# Patient Record
Sex: MALE | Race: WHITE | NOT HISPANIC OR LATINO | Employment: OTHER | URBAN - METROPOLITAN AREA
[De-identification: names, ages, dates, MRNs, and addresses within clinical notes are randomized per-mention and may not be internally consistent; named-entity substitution may affect disease eponyms.]

---

## 2017-05-03 ENCOUNTER — TRANSFERRED RECORDS (OUTPATIENT)
Dept: HEALTH INFORMATION MANAGEMENT | Facility: CLINIC | Age: 72
End: 2017-05-03

## 2017-09-21 ENCOUNTER — TRANSFERRED RECORDS (OUTPATIENT)
Dept: HEALTH INFORMATION MANAGEMENT | Facility: CLINIC | Age: 72
End: 2017-09-21

## 2017-11-13 DIAGNOSIS — Z95.828 HX OF ASCENDING AORTA REPLACEMENT: ICD-10-CM

## 2017-11-14 RX ORDER — METOPROLOL SUCCINATE 25 MG/1
TABLET, EXTENDED RELEASE ORAL
Qty: 45 TABLET | Refills: 0 | Status: SHIPPED | OUTPATIENT
Start: 2017-11-14 | End: 2017-11-29

## 2017-11-14 NOTE — TELEPHONE ENCOUNTER
Medication is being filled for 1 time refill only due to:  Patient needs to be seen because it has been more than one year since last visit.     Letter Sent.    Prescription approved per [facility] Refill Protocol.    Breanna BLAKE RN, BSN, PHN  [place] [facility] RN

## 2017-11-29 ENCOUNTER — OFFICE VISIT (OUTPATIENT)
Dept: PEDIATRICS | Facility: CLINIC | Age: 72
End: 2017-11-29
Payer: COMMERCIAL

## 2017-11-29 VITALS
SYSTOLIC BLOOD PRESSURE: 102 MMHG | DIASTOLIC BLOOD PRESSURE: 68 MMHG | BODY MASS INDEX: 26.37 KG/M2 | TEMPERATURE: 98.2 F | HEART RATE: 62 BPM | WEIGHT: 168 LBS | HEIGHT: 67 IN

## 2017-11-29 DIAGNOSIS — Z13.6 CARDIOVASCULAR SCREENING; LDL GOAL LESS THAN 160: ICD-10-CM

## 2017-11-29 DIAGNOSIS — M51.35 DDD (DEGENERATIVE DISC DISEASE), THORACOLUMBAR: ICD-10-CM

## 2017-11-29 DIAGNOSIS — Q23.81 BICUSPID AORTIC VALVE: ICD-10-CM

## 2017-11-29 DIAGNOSIS — Z95.828 HX OF ASCENDING AORTA REPLACEMENT: ICD-10-CM

## 2017-11-29 DIAGNOSIS — Z00.00 ENCOUNTER FOR ROUTINE ADULT HEALTH EXAMINATION WITHOUT ABNORMAL FINDINGS: Primary | ICD-10-CM

## 2017-11-29 DIAGNOSIS — D12.6 TUBULAR ADENOMA OF COLON: ICD-10-CM

## 2017-11-29 LAB
ALBUMIN SERPL-MCNC: 3.8 G/DL (ref 3.4–5)
ALP SERPL-CCNC: 62 U/L (ref 40–150)
ALT SERPL W P-5'-P-CCNC: 28 U/L (ref 0–70)
ANION GAP SERPL CALCULATED.3IONS-SCNC: 8 MMOL/L (ref 3–14)
AST SERPL W P-5'-P-CCNC: 10 U/L (ref 0–45)
BILIRUB SERPL-MCNC: 0.4 MG/DL (ref 0.2–1.3)
BUN SERPL-MCNC: 18 MG/DL (ref 7–30)
CALCIUM SERPL-MCNC: 9.3 MG/DL (ref 8.5–10.1)
CHLORIDE SERPL-SCNC: 109 MMOL/L (ref 94–109)
CHOLEST SERPL-MCNC: 168 MG/DL
CO2 SERPL-SCNC: 25 MMOL/L (ref 20–32)
CREAT SERPL-MCNC: 1.17 MG/DL (ref 0.66–1.25)
GFR SERPL CREATININE-BSD FRML MDRD: 62 ML/MIN/1.7M2
GLUCOSE SERPL-MCNC: 108 MG/DL (ref 70–99)
HDLC SERPL-MCNC: 51 MG/DL
LDLC SERPL CALC-MCNC: 94 MG/DL
NONHDLC SERPL-MCNC: 117 MG/DL
POTASSIUM SERPL-SCNC: 5.1 MMOL/L (ref 3.4–5.3)
PROT SERPL-MCNC: 6.8 G/DL (ref 6.8–8.8)
SODIUM SERPL-SCNC: 142 MMOL/L (ref 133–144)
TRIGL SERPL-MCNC: 113 MG/DL

## 2017-11-29 PROCEDURE — 80053 COMPREHEN METABOLIC PANEL: CPT | Performed by: INTERNAL MEDICINE

## 2017-11-29 PROCEDURE — 99397 PER PM REEVAL EST PAT 65+ YR: CPT | Performed by: INTERNAL MEDICINE

## 2017-11-29 PROCEDURE — 36415 COLL VENOUS BLD VENIPUNCTURE: CPT | Performed by: INTERNAL MEDICINE

## 2017-11-29 PROCEDURE — 80061 LIPID PANEL: CPT | Performed by: INTERNAL MEDICINE

## 2017-11-29 RX ORDER — METOPROLOL SUCCINATE 25 MG/1
TABLET, EXTENDED RELEASE ORAL
Qty: 45 TABLET | Refills: 3 | Status: SHIPPED | OUTPATIENT
Start: 2017-11-29 | End: 2019-03-18

## 2017-11-29 NOTE — NURSING NOTE
"Chief Complaint   Patient presents with     Medicare Visit       Initial /68 (Cuff Size: Adult Regular)  Pulse 62  Temp 98.2  F (36.8  C) (Oral)  Ht 5' 7\" (1.702 m)  Wt 168 lb (76.2 kg)  BMI 26.31 kg/m2 Estimated body mass index is 26.31 kg/(m^2) as calculated from the following:    Height as of this encounter: 5' 7\" (1.702 m).    Weight as of this encounter: 168 lb (76.2 kg).  Medication Reconciliation: shireen Gates CMA    "

## 2017-11-29 NOTE — MR AVS SNAPSHOT
After Visit Summary   11/29/2017    Mason Beatty    MRN: [id number]           Patient Information     Date Of Birth          1945        Visit Information        Provider Department      11/29/2017 7:40 AM Terry Mcmanus MD [facility]        Today's Diagnoses     Encounter for routine adult health examination without abnormal findings    -  1    Hx of ascending aorta replacement        Bicuspid aortic valve        Tubular adenoma of colon        DDD (degenerative disc disease), thoracolumbar        CARDIOVASCULAR SCREENING; LDL GOAL LESS THAN 160          Care Instructions      Preventive Health Recommendations:       Male Ages 65 and over    Yearly exam:             See your health care provider every year in order to  o   Review health changes.   o   Discuss preventive care.    o   Review your medicines if your doctor has prescribed any.    Talk with your health care provider about whether you should have a test to screen for prostate cancer (PSA).    Every 3 years, have a diabetes test (fasting glucose). If you are at risk for diabetes, you should have this test more often.    Every 5 years, have a cholesterol test. Have this test more often if you are at risk for high cholesterol or heart disease.     Every 10 years, have a colonoscopy. Or, have a yearly FIT test (stool test). These exams will check for colon cancer.    Talk to with your health care provider about screening for Abdominal Aortic Aneurysm if you have a family history of AAA or have a history of smoking.  Shots:     Get a flu shot each year.     Get a tetanus shot every 10 years.     Talk to your doctor about your pneumonia vaccines. There are now two you should receive - Pneumovax (PPSV 23) and Prevnar (PCV 13).    Talk to your doctor about a shingles vaccine.     Talk to your doctor about the hepatitis B vaccine.  Nutrition:     Eat at least 5 servings of fruits and vegetables each day.     Eat  whole-grain bread, whole-wheat pasta and brown rice instead of white grains and rice.     Talk to your doctor about Calcium and Vitamin D.   Lifestyle    Exercise for at least 150 minutes a week (30 minutes a day, 5 days a week). This will help you control your weight and prevent disease.     Limit alcohol to one drink per day.     No smoking.     Wear sunscreen to prevent skin cancer.     See your dentist every six months for an exam and cleaning.     See your eye doctor every 1 to 2 years to screen for conditions such as glaucoma, macular degeneration and cataracts.          Follow-ups after your visit        Who to contact     If you have questions or need follow up information about today's clinic visit or your schedule please contact [facility] at [phone].  Normal or non-critical lab and imaging results will be communicated to you by MyChart, letter or phone within 4 business days after the clinic has received the results. If you do not hear from us within 7 days, please contact the clinic through [employer] or phone. If you have a critical or abnormal lab result, we will notify you by phone as soon as possible.  Submit refill requests through [employer] or call your pharmacy and they will forward the refill request to us. Please allow 3 business days for your refill to be completed.          Additional Information About Your Visit        [employer] Information     [employer] gives you secure access to your electronic health record. If you see a primary care provider, you can also send messages to your care team and make appointments. If you have questions, please call your primary care clinic.  If you do not have a primary care provider, please call [phone] and they will assist you.        Care EveryWhere ID     This is your Care EveryWhere ID. This could be used by other organizations to access your [place] medical records  [id number]        Your Vitals Were     Pulse Temperature Height  "BMI (Body Mass Index)          62 98.2  F (36.8  C) (Oral) 5' 7\" (1.702 m) 26.31 kg/m2         Blood Pressure from Last 3 Encounters:   11/29/17 102/68   09/13/16 108/68   06/30/16 110/78    Weight from Last 3 Encounters:   11/29/17 168 lb (76.2 kg)   09/13/16 169 lb (76.7 kg)   06/30/16 164 lb 4.8 oz (74.5 kg)              We Performed the Following     Comprehensive metabolic panel (BMP + Alb, Alk Phos, ALT, AST, Total. Bili, TP)     Lipid panel reflex to direct LDL Fasting          Today's Medication Changes          These changes are accurate as of: 11/29/17  8:10 AM.  If you have any questions, ask your nurse or doctor.               These medicines have changed or have updated prescriptions.        Dose/Directions    metoprolol 25 MG 24 hr tablet   Commonly known as:  TOPROL-XL   This may have changed:  See the new instructions.   Used for:  Hx of ascending aorta replacement   Changed by:  Terry Mcmanus MD        TAKE ONE-HALF TABLET BY MOUTH ONCE DAILY   Quantity:  45 tablet   Refills:  3            Where to get your medicines      These medications were sent to [id number]  [place], [place] - [address]  [address], [place] [address]     Phone:  [phone]     metoprolol 25 MG 24 hr tablet                Primary Care Provider Office Phone # Fax #    Terry Mcmanus [phone]       [facility] [place] [address]        Equal Access to Services     [facility] AH: Hadii aad ku hadasho Soomaali, waaxda luqadaha, qaybta kaalmada adeegyada, waxay alessio her. So [facility] [phone].    ATENCIÓN: Si habla español, tiene a louis disposición servicios gratuitos de asistencia lingüística. Llame al [phone].    We comply with applicable federal civil rights laws and [place] laws. We do not discriminate on the basis of race, color, national origin, age, disability, sex, sexual orientation, or gender identity.          "   Thank you!     Thank you for choosing [facility] [place]  for your care. Our goal is always to provide you with excellent care. Hearing back from our patients is one way we can continue to improve our services. Please take a few minutes to complete the written survey that you may receive in the mail after your visit with us. Thank you!             Your Updated Medication List - Protect others around you: Learn how to safely use, store and throw away your medicines at www.disposemymeds.org.          This list is accurate as of: 11/29/17  8:10 AM.  Always use your most recent med list.                   Brand Name Dispense Instructions for use Diagnosis    ACETAMINOPHEN EXTRA STRENGTH 500 MG tablet   Generic drug:  acetaminophen      Take 1-2 tablets (500-1,000 mg) by mouth every 6 hours as needed for pain        aspirin 81 MG tablet      1 TABLET DAILY        metoprolol 25 MG 24 hr tablet    TOPROL-XL    45 tablet    TAKE ONE-HALF TABLET BY MOUTH ONCE DAILY    Hx of ascending aorta replacement       Multi-vitamin Tabs tablet   Generic drug:  multivitamin, therapeutic with minerals      None Entered

## 2017-11-29 NOTE — PROGRESS NOTES
SUBJECTIVE:   Mason Beatty is a 72 year old male who presents for Preventive Visit.    Are you in the first 12 months of your Medicare coverage?  No    Physical   Annual:     Getting at least 3 servings of Calcium per day::  Yes    Bi-annual eye exam::  Yes    Dental care twice a year::  Yes    Sleep apnea or symptoms of sleep apnea::  None    Frequency of exercise::  6-7 days/week    Taking medications regularly::  Yes    Medication side effects::  None    Additional concerns today::  No      COGNITIVE SCREEN  1) Repeat 3 items (Banana, Sunrise, Chair)    2) Clock draw: NORMAL  3) 3 item recall: Recalls 2 objects   Results: NORMAL clock, 1-2 items recalled: COGNITIVE IMPAIRMENT LESS LIKELY    Mini-CogTM Copyright JERMAINE Trivedi. Licensed by the author for use in [facility] Text A Cab; reprinted with permission (jeff@[facility]). All rights reserved.      Reviewed and updated as needed this visit by clinical staffTobacco  Allergies  Meds         Reviewed and updated as needed this visit by Provider        Social History   Substance Use Topics     Smoking status: Former Smoker     Quit date: 7/1/1986     Smokeless tobacco: Never Used     Alcohol use Yes      Comment: 1 beer qd       The patient does not drink >3 drinks per day nor >7 drinks per week.    Patient Active Problem List   Diagnosis     CARDIOVASCULAR SCREENING; LDL GOAL LESS THAN 160     Advanced directives, counseling/discussion     Hx of ascending aorta replacement     Bicuspid aortic valve     Tubular adenoma of colon     DDD (degenerative disc disease), thoracolumbar     Chronic lumbar radiculopathy     Past Medical History:   Diagnosis Date     Aortic aneurysm of unspecified site without mention of rupture      Nonspecific abnormal electrocardiogram (ECG) (EKG)      Scoliosis      Transverse myelitis (H)     2001       Past Surgical History:   Procedure Laterality Date     REPAIR ANEURYSM THORACOABDOMINAL      repair 2013, [place]       Family History    Problem Relation Age of Onset     C.A.D. Maternal Grandmother      heart attack     Hypertension Mother      CEREBROVASCULAR DISEASE Paternal Grandmother      Prostate Cancer Father      Cancer - colorectal No family hx of        ALLERGIES     Allergies   Allergen Reactions     Shellfish Allergy Swelling         Today's PHQ-2 Score:   PHQ-2 ( 1999 [employer]) 11/29/2017   Q1: Little interest or pleasure in doing things 0   Q2: Feeling down, depressed or hopeless 0   PHQ-2 Score 0   Q1: Little interest or pleasure in doing things Not at all   Q2: Feeling down, depressed or hopeless Not at all   PHQ-2 Score 0       Do you feel safe in your environment - Yes    Do you have a Health Care Directive?: Yes: Advance Directive has been received and scanned.    Current providers sharing in care for this patient include:   Patient Care Team:  Terry Mcmanus MD as PCP - General      Hearing impairment: No    Ability to successfully perform activities of daily living: Yes, no assistance needed     Fall risk:  Fallen 2 or more times in the past year?: No  Any fall with injury in the past year?: No      Home safety:  none identified      The following health maintenance items are reviewed in Epic and correct as of today:  Health Maintenance   Topic Date Due     HEPATITIS C SCREENING  06/30/1963     FALL RISK ASSESSMENT  09/13/2017     ADVANCE DIRECTIVE PLANNING Q5 YRS  10/21/2019     LIPID SCREEN Q5 YR MALE (SYSTEM ASSIGNED)  08/04/2020     TETANUS IMMUNIZATION (SYSTEM ASSIGNED)  10/04/2020     COLON CANCER SCREEN (SYSTEM ASSIGNED)  07/23/2025     INFLUENZA VACCINE (SYSTEM ASSIGNED)  Completed     PNEUMOCOCCAL  Completed     AORTIC ANEURYSM SCREENING (SYSTEM ASSIGNED)  Completed     Review of Systems  Constitutional, HEENT, cardiovascular, pulmonary, GI, , musculoskeletal, neuro, skin, endocrine and psych systems are negative, except as otherwise noted.      OBJECTIVE:   /68 (Cuff Size: Adult Regular)  Pulse 62  Temp  "98.2  F (36.8  C) (Oral)  Ht 5' 7\" (1.702 m)  Wt 168 lb (76.2 kg)  BMI 26.31 kg/m2 Estimated body mass index is 26.31 kg/(m^2) as calculated from the following:    Height as of this encounter: 5' 7\" (1.702 m).    Weight as of this encounter: 168 lb (76.2 kg).  Physical Exam  GENERAL: healthy, alert and no distress  EYES: Eyes grossly normal to inspection, PERRL and conjunctivae and sclerae normal  HENT: ear canals and TM's normal, nose and mouth without ulcers or lesions  NECK: no adenopathy, no asymmetry, masses, or scars and thyroid normal to palpation  RESP: lungs clear to auscultation - no rales, rhonchi or wheezes  CV: regular rate and rhythm, normal S1 S2, no S3 or S4, no murmur, click or rub, no peripheral edema and peripheral pulses strong  ABDOMEN: soft, nontender, no hepatosplenomegaly, no masses and bowel sounds normal  MS: no gross musculoskeletal defects noted, no edema  SKIN: no suspicious lesions or rashes  NEURO: Normal strength and tone, mentation intact and speech normal  PSYCH: mentation appears normal, affect normal/bright    ASSESSMENT / PLAN:       ICD-10-CM    1. Encounter for routine adult health examination without abnormal findings Z00.00        2. Hx of ascending aorta replacement Z95.828 metoprolol (TOPROL-XL) 25 MG 24 hr tablet.  Has done well since repair a few years ago     3. Bicuspid aortic valve Q23.1 Continue beta blocker     4. Tubular adenoma of colon D12.6 Next colonoscopy due 2020     5. DDD (degenerative disc disease), thoracolumbar M51.35 Chronic LBP, sx well controlled /continues daily therapy exercises     6. CARDIOVASCULAR SCREENING; LDL GOAL LESS THAN 160 Z13.6 Lipid panel reflex to direct LDL Fasting     Comprehensive metabolic panel (BMP + Alb, Alk Phos, ALT, AST, Total. Bili, TP)       End of Life Planning:  Patient currently has an advanced directive: Yes.  Practitioner is supportive of decision.    COUNSELING:  Reviewed preventive health counseling, as reflected " "in patient instructions       Regular exercise       Healthy diet/nutrition       Colon cancer screening    Estimated body mass index is 26.31 kg/(m^2) as calculated from the following:    Height as of this encounter: 5' 7\" (1.702 m).    Weight as of this encounter: 168 lb (76.2 kg).     reports that he quit smoking about 31 years ago. He has never used smokeless tobacco.        Appropriate preventive services were discussed with this patient, including applicable screening as appropriate for cardiovascular disease, diabetes, osteopenia/osteoporosis, and glaucoma.  As appropriate for age/gender, discussed screening for colorectal cancer, prostate cancer, breast cancer, and cervical cancer. Checklist reviewing preventive services available has been given to the patient.    Reviewed patients plan of care and provided an AVS. The Basic Care Plan (routine screening as documented in Health Maintenance) for Mason meets the Care Plan requirement. This Care Plan has been established and reviewed with the Patient.    Counseling Resources:  ATP IV Guidelines  Pooled Cohorts Equation Calculator  Breast Cancer Risk Calculator  FRAX Risk Assessment  ICSI Preventive Guidelines  Dietary Guidelines for Americans, 2010  USDA's MyPlate  ASA Prophylaxis  Lung CA Screening    Terry Mcmanus MD, MD  [facility] [place]  "

## 2017-12-04 ENCOUNTER — TELEPHONE (OUTPATIENT)
Dept: PEDIATRICS | Facility: CLINIC | Age: 72
End: 2017-12-04

## 2017-12-04 DIAGNOSIS — Z12.5 ENCOUNTER FOR SCREENING FOR MALIGNANT NEOPLASM OF PROSTATE: Primary | ICD-10-CM

## 2017-12-04 NOTE — TELEPHONE ENCOUNTER
Patient was seen on 11/29/17for physical.  Wife is calling and would like PSA ordered. They feel this is a test that should be monitored.  Order pended please sign if in agreement.  Send patient a My chart message regarding this order.  DANAE Clinton RN

## 2017-12-11 DIAGNOSIS — Z12.5 ENCOUNTER FOR SCREENING FOR MALIGNANT NEOPLASM OF PROSTATE: ICD-10-CM

## 2017-12-11 PROCEDURE — G0103 PSA SCREENING: HCPCS | Performed by: INTERNAL MEDICINE

## 2017-12-12 LAB — PSA SERPL-ACNC: 1.81 UG/L (ref 0–4)

## 2018-07-24 ENCOUNTER — MYC MEDICAL ADVICE (OUTPATIENT)
Dept: PEDIATRICS | Facility: CLINIC | Age: 73
End: 2018-07-24

## 2018-07-24 DIAGNOSIS — S93.402D SPRAIN OF LEFT ANKLE, UNSPECIFIED LIGAMENT, SUBSEQUENT ENCOUNTER: Primary | ICD-10-CM

## 2018-07-24 NOTE — TELEPHONE ENCOUNTER
Order pended, please sign if in agreement and fax to [facility].  Will have to look up that number-I don't have that information.  DANAE Clinton RN  .

## 2018-08-01 ENCOUNTER — MYC MEDICAL ADVICE (OUTPATIENT)
Dept: PEDIATRICS | Facility: CLINIC | Age: 73
End: 2018-08-01

## 2018-08-01 DIAGNOSIS — M25.579 PAIN IN JOINT, ANKLE AND FOOT, UNSPECIFIED LATERALITY: Primary | ICD-10-CM

## 2019-03-18 DIAGNOSIS — Z95.828 HX OF ASCENDING AORTA REPLACEMENT: ICD-10-CM

## 2019-03-18 NOTE — TELEPHONE ENCOUNTER
"Requested Prescriptions   Pending Prescriptions Disp Refills     metoprolol succinate ER (TOPROL-XL) 25 MG 24 hr tablet [Pharmacy Med Name: METOPROLOL ER 25MG  TAB]  Last Written Prescription Date:  11/29/2017  Last Fill Quantity: 45 tablet,  # refills: 3   Last office visit: 11/29/2017 with prescribing provider:  Terry Mcmanus MD   Future Office Visit:     45 tablet 3     Sig: TAKE ONE-HALF TABLET BY MOUTH ONCE DAILY    Beta-Blockers Protocol Failed - 3/18/2019 11:43 AM       Failed - Blood pressure under 140/90 in past 12 months    BP Readings from Last 3 Encounters:   11/29/17 102/68   09/13/16 108/68   06/30/16 110/78                Failed - Recent (12 mo) or future (30 days) visit within the authorizing provider's specialty    Patient had office visit in the last 12 months or has a visit in the next 30 days with authorizing provider or within the authorizing provider's specialty.  See \"Patient Info\" tab in inbasket, or \"Choose Columns\" in Meds & Orders section of the refill encounter.             Passed - Patient is age 6 or older       Passed - Medication is active on med list          "

## 2019-03-19 RX ORDER — METOPROLOL SUCCINATE 25 MG/1
12.5 TABLET, EXTENDED RELEASE ORAL DAILY
Qty: 45 TABLET | Refills: 0 | Status: SHIPPED | OUTPATIENT
Start: 2019-03-19 | End: 2019-04-22

## 2019-03-19 NOTE — TELEPHONE ENCOUNTER
Medication is being filled for 1 time refill only due to:  Patient needs to be seen because it has been more than one year since last visit. 11/28/17-call to schedule appointment with pcp    Rosalee Waters RN

## 2019-04-22 ENCOUNTER — OFFICE VISIT (OUTPATIENT)
Dept: PEDIATRICS | Facility: CLINIC | Age: 74
End: 2019-04-22
Payer: MEDICARE

## 2019-04-22 VITALS
WEIGHT: 171 LBS | BODY MASS INDEX: 26.84 KG/M2 | HEART RATE: 72 BPM | OXYGEN SATURATION: 97 % | SYSTOLIC BLOOD PRESSURE: 114 MMHG | DIASTOLIC BLOOD PRESSURE: 70 MMHG | HEIGHT: 67 IN | TEMPERATURE: 97.6 F

## 2019-04-22 DIAGNOSIS — Z95.828 HX OF ASCENDING AORTA REPLACEMENT: ICD-10-CM

## 2019-04-22 DIAGNOSIS — D23.9 INTRADERMAL NEVUS: ICD-10-CM

## 2019-04-22 DIAGNOSIS — M79.674 PAIN OF TOE OF RIGHT FOOT: ICD-10-CM

## 2019-04-22 DIAGNOSIS — M54.16 CHRONIC LUMBAR RADICULOPATHY: Primary | ICD-10-CM

## 2019-04-22 DIAGNOSIS — Z12.5 SCREENING FOR PROSTATE CANCER: ICD-10-CM

## 2019-04-22 PROCEDURE — 99214 OFFICE O/P EST MOD 30 MIN: CPT | Performed by: INTERNAL MEDICINE

## 2019-04-22 RX ORDER — METOPROLOL SUCCINATE 25 MG/1
12.5 TABLET, EXTENDED RELEASE ORAL DAILY
Qty: 45 TABLET | Refills: 3 | Status: SHIPPED | OUTPATIENT
Start: 2019-04-22 | End: 2020-06-01

## 2019-04-22 ASSESSMENT — MIFFLIN-ST. JEOR: SCORE: 1481.34

## 2019-04-22 NOTE — PROGRESS NOTES
SUBJECTIVE:   Mason Beatty is a 73 year old male who presents to clinic today for the following   health issues:    History of chronic low back pain and chronic lumbar radiculopathy.  Symptoms persistent for several years.  Since most recent visit patient has been following up with [facility].  Patient describes chronic pain across his lower back radiating to the posterior aspect of both legs right worse than left.  Denies stool or urinary incontinence.  Patient is planning for additional evaluation through [facility] however [facility] requires an MRI scan prior to the visit.    Concerns about pain in his second toe.  Denies any recent injury.  States that second toe tends to hyperextend at the distal IP joint which tends to cause pain.  Denies numbness or paresthesias.    Concerns about facial nevi he would like looked at today.  Noted over the past few months, possibly slightly larger in size.    Patient  is requesting prostate cancer screening.  Patient denies urinary hesitancy, decreased force of stream or other BPH symptoms.  Denies hematuria.    Several years out from surgery for replacement of a sending aorta secondary to aortic aneurysm.  Has had no long-term complications continues on long-term beta-blocker.      Amount of exercise or physical activity: walk 2 miles daily    Problems taking medications regularly: No    Medication side effects: none    Diet: low salt      Patient Active Problem List   Diagnosis     CARDIOVASCULAR SCREENING; LDL GOAL LESS THAN 160     Advanced directives, counseling/discussion     Hx of ascending aorta replacement     Bicuspid aortic valve     Tubular adenoma of colon     DDD (degenerative disc disease), thoracolumbar     Chronic lumbar radiculopathy     Past Surgical History:   Procedure Laterality Date     REPAIR ANEURYSM THORACOABDOMINAL      repair 2013, [place]       Social History     Tobacco Use     Smoking status: Former Smoker     Last attempt to quit:  "1986     Years since quittin.8     Smokeless tobacco: Never Used   Substance Use Topics     Alcohol use: Yes     Comment: 1 beer qd     Family History   Problem Relation Age of Onset     C.A.D. Maternal Grandmother         heart attack     Hypertension Mother      Cerebrovascular Disease Paternal Grandmother      Prostate Cancer Father      Cancer - colorectal No family hx of          Current Outpatient Medications   Medication Sig Dispense Refill     acetaminophen (ACETAMINOPHEN EXTRA STRENGTH) 500 MG tablet Take 1-2 tablets (500-1,000 mg) by mouth every 6 hours as needed for pain       ASPIRIN 81 MG OR TABS 1 TABLET DAILY       metoprolol succinate ER (TOPROL-XL) 25 MG 24 hr tablet Take 0.5 tablets (12.5 mg) by mouth daily 45 tablet 3     MULTI-VITAMIN OR TABS None Entered         ROS: The following systems have been completely reviewed and are negative except as noted in the HPI: CONSTITUTIONAL,  CARDIOVASCULAR, PULMONARY DERMATOLOGIC, NEUROLOGIC    OBJECTIVE:                                                    /70 (Cuff Size: Adult Regular)   Pulse 72   Temp 97.6  F (36.4  C) (Oral)   Ht 1.689 m (5' 6.5\")   Wt 77.6 kg (171 lb)   SpO2 97%   BMI 27.19 kg/m   Body mass index is 27.19 kg/m .  GENERAL:  alert,  no distress  Derm: 3 flesh-colored well demarcated exophytic subcutaneous nevi on both cheeks, each approximately 5 mm  RESP: lungs clear to auscultation - no rales, no rhonchi, no wheezes  CV: regular rates and rhythm, normal S1 S2, no S3 or S4 and no murmur, no click or rub  Back: generalized tenderness across low back, straight leg raise reproduces sx b/l  MS: extremities- no edema.  Second toe: Without erythema warmth or skin breakdown     ASSESSMENT/PLAN:                                                        ICD-10-CM    1. Chronic lumbar radiculopathy M54.16 MR Lumbar Spine w/o Contrast      Chronic low back pain and chronic lumbar radicular symptoms.  Patient requests MRI scan prior " to [facility] visit requested by [place]     2. Pain of toe of right foot M79.674  normal exam, low suspicion for fracture.  Recommended juan taping second and third toes for the next week or 2 and follow-up if sx persist     3. Intradermal nevus   intradermal nevi.  Diagnosis discussed, reassurance     4. Screening for prostate cancer Z12.5  patient requests PSA screening today.  Reviewed guidelines/PSA generally not recommended over age 70.  Pros and cons of PSA testing reviewed-patient declines lab work today     5. Hx of ascending aorta replacement Z95.828 metoprolol succinate ER (TOPROL-XL) 25 MG 24 hr tablet        Terry Mcmanus MD  [facility]

## 2019-04-23 ENCOUNTER — HOSPITAL ENCOUNTER (OUTPATIENT)
Dept: MRI IMAGING | Facility: CLINIC | Age: 74
Discharge: HOME | End: 2019-04-23
Attending: INTERNAL MEDICINE | Admitting: INTERNAL MEDICINE
Payer: MEDICARE

## 2019-04-23 ENCOUNTER — TRANSFERRED RECORDS (OUTPATIENT)
Dept: HEALTH INFORMATION MANAGEMENT | Facility: CLINIC | Age: 74
End: 2019-04-23

## 2019-04-23 DIAGNOSIS — M54.16 CHRONIC LUMBAR RADICULOPATHY: ICD-10-CM

## 2019-04-23 PROCEDURE — 72148 MRI LUMBAR SPINE W/O DYE: CPT

## 2019-10-02 ENCOUNTER — HEALTH MAINTENANCE LETTER (OUTPATIENT)
Age: 74
End: 2019-10-02

## 2019-10-30 ENCOUNTER — HEALTH MAINTENANCE LETTER (OUTPATIENT)
Age: 74
End: 2019-10-30

## 2019-11-07 ENCOUNTER — TRANSFERRED RECORDS (OUTPATIENT)
Dept: HEALTH INFORMATION MANAGEMENT | Facility: CLINIC | Age: 74
End: 2019-11-07

## 2019-12-16 ENCOUNTER — HEALTH MAINTENANCE LETTER (OUTPATIENT)
Age: 74
End: 2019-12-16

## 2020-05-31 DIAGNOSIS — Z95.828 HX OF ASCENDING AORTA REPLACEMENT: ICD-10-CM

## 2020-06-01 RX ORDER — METOPROLOL SUCCINATE 25 MG/1
TABLET, EXTENDED RELEASE ORAL
Qty: 45 TABLET | Refills: 3 | Status: SHIPPED | OUTPATIENT
Start: 2020-06-01 | End: 2021-06-15

## 2020-06-01 NOTE — TELEPHONE ENCOUNTER
LVM for pt to call clinic.  Please schedule pt for a med check appointment with Dr. Mcmanus.  Also please ask PT if they will have enough rx until the appointment and route back to ea refill

## 2020-06-01 NOTE — TELEPHONE ENCOUNTER
Routing refill request to provider for review/approval because:  Labs not current:  BP  Patient needs to be seen because it has been more than 1 year since last office visit.    Care team please assist patient in scheduling appt.  PCP please advise on refill.    Trevor ARROYO RN, BSN

## 2020-06-02 DIAGNOSIS — Z95.828 HX OF ASCENDING AORTA REPLACEMENT: ICD-10-CM

## 2020-06-02 RX ORDER — METOPROLOL SUCCINATE 25 MG/1
TABLET, EXTENDED RELEASE ORAL
Qty: 45 TABLET | Refills: 3 | Status: CANCELLED | OUTPATIENT
Start: 2020-06-02

## 2020-06-02 NOTE — TELEPHONE ENCOUNTER
Pending Prescriptions:                       Disp   Refills    metoprolol succinate ER (TOPROL-XL) 25 MG*45 tab*3          Pt has phone visit with Dr Mcmanus 6/9/20 for med review.  Pt will need refill before that appt.  Please complete refill.   Pt Ph # [phone].  Thank you.  oneal

## 2020-06-09 ENCOUNTER — VIRTUAL VISIT (OUTPATIENT)
Dept: PEDIATRICS | Facility: CLINIC | Age: 75
End: 2020-06-09
Payer: MEDICARE

## 2020-06-09 DIAGNOSIS — Z95.828 HX OF ASCENDING AORTA REPLACEMENT: ICD-10-CM

## 2020-06-09 DIAGNOSIS — M72.0 DUPUYTREN'S CONTRACTURE OF HAND: ICD-10-CM

## 2020-06-09 DIAGNOSIS — M41.9 SCOLIOSIS, UNSPECIFIED SCOLIOSIS TYPE, UNSPECIFIED SPINAL REGION: ICD-10-CM

## 2020-06-09 DIAGNOSIS — M51.35 DDD (DEGENERATIVE DISC DISEASE), THORACOLUMBAR: Primary | ICD-10-CM

## 2020-06-09 PROCEDURE — 99442 ZZC PHYSICIAN TELEPHONE EVALUATION 11-20 MIN: CPT | Performed by: INTERNAL MEDICINE

## 2020-06-09 NOTE — PROGRESS NOTES
"Mason Beatty is a 74 year old male who is being evaluated via a billable telephone visit.      The patient has been notified of following:     \"This telephone visit will be conducted via a call between you and your physician/provider. We have found that certain health care needs can be provided without the need for a physical exam.  This service lets us provide the care you need with a short phone conversation.  If a prescription is necessary we can send it directly to your pharmacy.  If lab work is needed we can place an order for that and you can then stop by our lab to have the test done at a later time.    Telephone visits are billed at different rates depending on your insurance coverage. During this emergency period, for some insurers they may be billed the same as an in-person visit.  Please reach out to your insurance provider with any questions.    If during the course of the call the physician/provider feels a telephone visit is not appropriate, you will not be charged for this service.\"    Patient has given verbal consent for Telephone visit?  Yes    What phone number would you like to be contacted at? [phone]    How would you like to obtain your AVS? [place]    Subjective     Mason Beatty is a 74 year old male who presents via phone visit today for the following health issues:    History of chronic low back pain secondary to lumbar degenerative disc disease and scoliosis.  Continues to note daily low back pain at times radiating to the posterior aspect of both legs.  Patient has met with orthopedics/spine and no procedures planned at this time.  Patient denies numbness paresthesias or weakness of the lower extremities.    History of surgical replacement of ascending aorta, now several years postop.  Continues low-dose beta-blocker which he is tolerating without side effects.    Additional concerns regarding deformity of his fifth digit of one hand.  States his digit is fixed in a flexed " position at the IP joint.  No associated pain or recent injury, deformity has gradually worsened over the past year.    Patient Active Problem List   Diagnosis     Advanced directives, counseling/discussion     Hx of ascending aorta replacement     Bicuspid aortic valve     Tubular adenoma of colon     DDD (degenerative disc disease), thoracolumbar     Chronic lumbar radiculopathy     Scoliosis, unspecified scoliosis type, unspecified spinal region     Past Surgical History:   Procedure Laterality Date     REPAIR ANEURYSM THORACOABDOMINAL      repair , [place]       Social History     Tobacco Use     Smoking status: Former Smoker     Last attempt to quit: 1986     Years since quittin.9     Smokeless tobacco: Never Used   Substance Use Topics     Alcohol use: Yes     Comment: 1 beer qd     Family History   Problem Relation Age of Onset     C.A.D. Maternal Grandmother         heart attack     Hypertension Mother      Cerebrovascular Disease Paternal Grandmother      Prostate Cancer Father      Cancer - colorectal No family hx of          Current Outpatient Medications   Medication Sig Dispense Refill     acetaminophen (ACETAMINOPHEN EXTRA STRENGTH) 500 MG tablet Take 1-2 tablets (500-1,000 mg) by mouth every 6 hours as needed for pain       ASPIRIN 81 MG OR TABS 1 TABLET DAILY       metoprolol succinate ER (TOPROL-XL) 25 MG 24 hr tablet Take 1/2 (one-half) tablet by mouth once daily 45 tablet 3     MULTI-VITAMIN OR TABS None Entered         Reviewed and updated as needed this visit by Provider         Review of Systems   cardiovascular, pulmonary are negative, except as otherwise noted.       Objective         Assessment/Plan:  1. DDD (degenerative disc disease), thoracolumbar  2. Scoliosis, unspecified scoliosis type, unspecified spinal region      Discussed nonoperative management of chronic low back pain.  Patient continues daily physical therapy exercises, recommended trial of yoga    3. Hx of ascending  aorta replacement     Continue metoprolol    4. Dupuytren's contracture of hand      Suspect flexion contracture based on history referred to orthopedics  - Orthopedic & Spine  Referral; Future    Phone call duration:  12 minutes    Terry Mcmanus MD

## 2020-06-09 NOTE — PATIENT INSTRUCTIONS
Mason thank you for your time today.  Here's a summary of our virtual visit and the plan we discussed.   Please let us know if you have any additional questions:    Please call to schedule a visit with orthopedics:    [facility] ([place]) [phone]     Have a good day!  Dr Mcmanus

## 2020-06-16 ENCOUNTER — TRANSFERRED RECORDS (OUTPATIENT)
Dept: HEALTH INFORMATION MANAGEMENT | Facility: CLINIC | Age: 75
End: 2020-06-16

## 2020-07-29 ENCOUNTER — OFFICE VISIT (OUTPATIENT)
Dept: PEDIATRICS | Facility: CLINIC | Age: 75
End: 2020-07-29
Payer: MEDICARE

## 2020-07-29 VITALS
DIASTOLIC BLOOD PRESSURE: 62 MMHG | HEART RATE: 63 BPM | OXYGEN SATURATION: 97 % | RESPIRATION RATE: 16 BRPM | WEIGHT: 174.3 LBS | SYSTOLIC BLOOD PRESSURE: 118 MMHG | BODY MASS INDEX: 26.42 KG/M2 | HEIGHT: 68 IN | TEMPERATURE: 96.4 F

## 2020-07-29 DIAGNOSIS — Q23.81 BICUSPID AORTIC VALVE: ICD-10-CM

## 2020-07-29 DIAGNOSIS — Z01.818 PREOP GENERAL PHYSICAL EXAM: Primary | ICD-10-CM

## 2020-07-29 DIAGNOSIS — I35.1 AORTIC VALVE INSUFFICIENCY, ETIOLOGY OF CARDIAC VALVE DISEASE UNSPECIFIED: ICD-10-CM

## 2020-07-29 DIAGNOSIS — L84 CALLUS OF FOOT: ICD-10-CM

## 2020-07-29 DIAGNOSIS — Z95.828 HX OF ASCENDING AORTA REPLACEMENT: ICD-10-CM

## 2020-07-29 DIAGNOSIS — H25.9 AGE-RELATED CATARACT OF BOTH EYES, UNSPECIFIED AGE-RELATED CATARACT TYPE: ICD-10-CM

## 2020-07-29 PROCEDURE — 99215 OFFICE O/P EST HI 40 MIN: CPT | Mod: GC | Performed by: STUDENT IN AN ORGANIZED HEALTH CARE EDUCATION/TRAINING PROGRAM

## 2020-07-29 ASSESSMENT — MIFFLIN-ST. JEOR: SCORE: 1510.12

## 2020-07-29 NOTE — PROGRESS NOTES
[facility]  [address]  [address]  [place] [address]  [phone]  Dept: [phone]    PRE-OP EVALUATION:  Today's date: 2020    Mason Beatty (: 1945) presents for pre-operative evaluation assessment as requested by Dr. Schaefer.  He requires evaluation and anesthesia risk assessment prior to undergoing surgery/procedure for treatment of Cataract.    Proposed Surgery/ Procedure: Cataract   Date of Surgery/ Procedure:  &    Time of Surgery/ Procedure: NA  Hospital/Surgical Facility: [facility]   Surgery Fax Number: [phone]  Primary Physician: Terry Mcmanus  Type of Anesthesia Anticipated: NA    Preoperative Questionnaire:   No - Have you ever had a heart attack or stroke?  Yes - Have you ever had surgery on your heart or blood vessels, such as a stent, coronary (heart) bypass, or surgery on an artery in the head, neck, heart, or legs? - aortic aneurysm repair  No - Do you have chest pain when you are physically active?  No - Do you have a history of heart failure?  No - Do you currently have a cold, bronchitis, or symptoms of other respiratory (head and chest) infections?  No - Do you have a cough, shortness of breath, or wheezing?  No - Do you or anyone in your family have a history of blood clots?  No - Do you or anyone in your family have a serious bleeding problem, such as long-lasting bleeding after surgeries or cuts?  No - Have you ever had anemia or been told to take iron pills?  No - Have you had any abnormal blood loss such as black, tarry or bloody stools, or abnormal vaginal bleeding?  Yes - Have you ever had a blood transfusion? For aortic aneurysm repair  Yes - Are you willing to have a blood transfusion if it is medically needed before, during, or after your surgery?  No - Have you or anyone in your family ever had problems with anesthesia (sedation for surgery)?  No - Do you have sleep apnea, excessive  snoring, or daytime drowsiness?  No - Do you have any artifical heart valves or other implanted medical devices, such as a pacemaker, defibrillator, or continuous glucose monitor?  No - Do you have any artifical joints?  No - Are you allergic to latex?  No - Is there any chance that you may be pregnant?    Patient has a Health Care Directive or Living Will:  YES    HPI:     HPI related to upcoming procedure: Has had issues with vision. Had annual vision checks with optometrist and could not improve anymore with corrective lenses, so recommended surgery. No chest pain, shortness of breath, abdominal pain, lightheadedness.    History of aortic aneurysm arch repair and has been on beta blocker and aspirin. Also takes vitamin C.    Activities: Housework, mow the lawn. Elliptical 30 minutes daily, walks dog 2 miles per day. Climbs flight of stairs without issues.      See problem list for active medical problems.  Problems all longstanding and stable, except as noted/documented.  See ROS for pertinent symptoms related to these conditions.      MEDICAL HISTORY:     Patient Active Problem List    Diagnosis Date Noted     Scoliosis, unspecified scoliosis type, unspecified spinal region 06/09/2020     Priority: Medium     Chronic lumbar radiculopathy 09/13/2016     Priority: Medium     DDD (degenerative disc disease), thoracolumbar 08/04/2015     Priority: Medium     Tubular adenoma of colon 08/02/2015     Priority: Medium     Bicuspid aortic valve      Priority: Medium     Hx of ascending aorta replacement 12/02/2013     Priority: Medium     26 mm Hemashield Dacron graft       Advanced directives, counseling/discussion 03/11/2013     Priority: Low     10/21/14 Advance Directive Invitation mailed to patient. AD Calhoun RN          Past Medical History:   Diagnosis Date     Aortic aneurysm of unspecified site without mention of rupture      Nonspecific abnormal electrocardiogram (ECG) (EKG)      Scoliosis      Transverse  "myelitis (H)          Past Surgical History:   Procedure Laterality Date     REPAIR ANEURYSM THORACOABDOMINAL      repair 2013, [place]     Current Outpatient Medications   Medication Sig Dispense Refill     acetaminophen (ACETAMINOPHEN EXTRA STRENGTH) 500 MG tablet Take 1-2 tablets (500-1,000 mg) by mouth every 6 hours as needed for pain       ASPIRIN 81 MG OR TABS 1 TABLET DAILY       metoprolol succinate ER (TOPROL-XL) 25 MG 24 hr tablet Take 1/2 (one-half) tablet by mouth once daily 45 tablet 3     MULTI-VITAMIN OR TABS None Entered       OTC products: herbals and vitamins - vitamin C; Tylenol and ibuprofen 2-3x per week    Allergies   Allergen Reactions     Shellfish Allergy Swelling      Latex Allergy: NO    Social History     Tobacco Use     Smoking status: Former Smoker     Last attempt to quit: 1986     Years since quittin.1     Smokeless tobacco: Never Used   Substance Use Topics     Alcohol use: Yes     Comment: 1 beer qd     History   Drug Use No     REVIEW OF SYSTEMS:   Constitutional, neuro, ENT, endocrine, pulmonary, cardiac, gastrointestinal, genitourinary, musculoskeletal, integument and psychiatric systems are negative, except as otherwise noted.    EXAM:   /62 (BP Location: Right arm, Patient Position: Chair, Cuff Size: Adult Regular)   Pulse 63   Temp 96.4  F (35.8  C) (Tympanic)   Resp 16   Ht 1.727 m (5' 8\")   Wt 79.1 kg (174 lb 4.8 oz)   SpO2 97%   BMI 26.50 kg/m      GENERAL APPEARANCE: healthy, alert and no distress     EYES: EOMI,  PERRL     HENT: ear canals and TM's normal and nose and mouth without ulcers or lesions     NECK: no adenopathy, no asymmetry, masses, or scars      RESP: lungs clear to auscultation - no rales, rhonchi or wheezes     CV: regular rate and rhythm, normal S1 S2; mild diastolic murmur at left parasternal border; well healed midline sternal surgical incision     ABDOMEN:  soft, nontender, no HSM or masses and bowel sounds normal     MS: " extremities normal- no gross deformities noted, no evidence of inflammation in joints, FROM in all extremities.     SKIN: no suspicious lesions or rashes     NEURO: Normal strength and tone, sensory exam grossly normal, mentation intact and speech normal     PSYCH: mentation appears normal. and affect normal/bright     LYMPHATICS: No cervical adenopathy    DIAGNOSTICS:   No labs or EKG required for low risk surgery (cataract, skin procedure, breast biopsy, etc)    Recent Labs   Lab Test 11/29/17  0815 08/04/15  1013 12/10/13  1442 12/10/13  0000   HGB  --   --  10.9* 10.9   PLT  --   --  456*  --     140 138 138   POTASSIUM 5.1 4.2 4.6 4.6   CR 1.17 1.00 0.91 0.91      IMPRESSION:   Reason for surgery/procedure: Cataract, bilateral  Diagnosis/reason for consult: Pre-operative examination    The proposed surgical procedure is considered LOW risk.    REVISED CARDIAC RISK INDEX  The patient has the following serious cardiovascular risks for perioperative complications such as (MI, PE, VFib and 3  AV Block):  No serious cardiac risks  INTERPRETATION: 1 risks: Class II (low risk - 0.9% complication rate)    The patient has the following additional risks for perioperative complications:  The 10-year ASCVD risk score (Barbrula BROWN Jr., et al., 2013) is: 17.9%    Values used to calculate the score:      Age: 75 years      Sex: Male      Is Non- : No      Diabetic: No      Tobacco smoker: No      Systolic Blood Pressure: 118 mmHg      Is BP treated: No      HDL Cholesterol: 51 mg/dL      Total Cholesterol: 168 mg/dL      ICD-10-CM    1. Preop general physical exam  Z01.818    2. Age-related cataract of both eyes, unspecified age-related cataract type  H25.9    3. Hx of ascending aorta replacement  Z95.828 CT Chest Angio w/o & w Contrast     Echocardiogram Complete     CANCELED: ECHO Congenital Transthoracic (TTE)   4. Aortic valve insufficiency, etiology of cardiac valve disease unspecified  I35.1  Echocardiogram Complete     CANCELED: ECHO Congenital Transthoracic (TTE)   5. Bicuspid aortic valve  Q23.1 Echocardiogram Complete     CANCELED: ECHO Congenital Transthoracic (TTE)   6. Callus of foot  [facility]  Referral       RECOMMENDATIONS:   Cardiovascular Risk  Performs 4 METs exercise without symptoms (Light housework (dusting, washing dishes), Climb a flight of stairs and Walk on level ground at 15 minutes per mile (4 miles/hour)) .   Patient is already on a Beta Blocker. Continue Betablocker therapy after surgery, using Beta blocker order set as necessary for NPO status.    --Patient is to take all scheduled medications on the day of surgery EXCEPT for modifications listed below.    APPROVAL GIVEN to proceed with proposed procedure, without further diagnostic evaluation  Should have echocardiogram and CTA chest to follow up on bicuspid valve & aortic regurgitation and aneurysm repair, but this should not delay surgery.    Signed Electronically by: Jazzmine Anthony MD    Copy of this evaluation report is provided to requesting physician.    [facility] Preop Guidelines    Revised Cardiac Risk Index    ---------------    I personally saw this patient and discussed this case in depth with Dr. Anthony. I reviewed and agree with the key components of the history, physical, assessment, and plan. Will staff message PCP about Cardiology follow up and Imaging ordered.      TRACI Sherman MD  Internal Medicine-Pediatrics

## 2020-08-18 ENCOUNTER — OFFICE VISIT (OUTPATIENT)
Dept: PODIATRY | Facility: CLINIC | Age: 75
End: 2020-08-18
Payer: MEDICARE

## 2020-08-18 VITALS
SYSTOLIC BLOOD PRESSURE: 120 MMHG | WEIGHT: 174.3 LBS | HEIGHT: 68 IN | DIASTOLIC BLOOD PRESSURE: 64 MMHG | BODY MASS INDEX: 26.42 KG/M2

## 2020-08-18 DIAGNOSIS — L84 CALLUS OF FOOT: ICD-10-CM

## 2020-08-18 DIAGNOSIS — M21.611 BUNION OF GREAT TOE OF RIGHT FOOT: Primary | ICD-10-CM

## 2020-08-18 PROCEDURE — 99204 OFFICE O/P NEW MOD 45 MIN: CPT | Performed by: PODIATRIST

## 2020-08-18 ASSESSMENT — MIFFLIN-ST. JEOR: SCORE: 1510.12

## 2020-08-18 NOTE — PROGRESS NOTES
"Foot & Ankle Surgery  August 18, 2020    CC: \"bunion?\"    I was asked to see Mason Beatty regarding the chief complaint by:  Dr. DANAE Anthony    HPI:  Pt is a 75 year old male who presents with above complaint.  Right foot pain x years.  \"information/diagnosis/cure(?)\".  \"none usually\" for symptoms.  Points to plantar-medial R 1st MPJ, where he has callusing/blood blister formation in setting of prominent bunion.  \"good question\" when asked if the bump is painful or the callusing is the issue.  Seems to indicate the callusing/blood blister is more bothersome.  Has comfortable shoes.  No treatment otherwise noted.  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  The patient denies current nausea, vomiting, chills, fevers, belly pain, calf pain, chest pain or SOB.  Complete remainder of ROS is otherwise neg.    VITALS:    Vitals:    08/18/20 0829   BP: 120/64   Weight: 79.1 kg (174 lb 4.8 oz)   Height: 1.727 m (5' 8\")       PMH:    Past Medical History:   Diagnosis Date     Aortic aneurysm of unspecified site without mention of rupture      Nonspecific abnormal electrocardiogram (ECG) (EKG)      Scoliosis      Transverse myelitis (H)     2001       SXHX:    Past Surgical History:   Procedure Laterality Date     REPA[facility]    Current Outpatient Medications   Medication     acetaminophen (ACETAMINOPHEN EXTRA STRENGTH) 500 MG tablet     ASPIRIN 81 MG OR TABS     metoprolol succinate ER (TOPROL-XL) 25 MG 24 hr tablet     MULTI-VITAMIN OR TABS     No current facility-administered medications for this visit.        ALL:     Allergies   Allergen Reactions     Shellfish Allergy Swelling       FMH:    Family History   Problem Relation Age of Onset     C.A.D. Maternal Grandmother         heart attack     Hypertension Mother      Cerebrovascular Disease Paternal Grandmother      Prostate Cancer Father      Cancer - colorectal No family hx of        SocHx:  "   Social History     Socioeconomic History     Marital status:      Spouse name: Not on file     Number of children: Not on file     Years of education: Not on file     Highest education level: Not on file   Occupational History     Not on file   Social Needs     Financial resource strain: Not on file     Food insecurity     Worry: Not on file     Inability: Not on file     Transportation needs     Medical: Not on file     Non-medical: Not on file   Tobacco Use     Smoking status: Former Smoker     Last attempt to quit: 1986     Years since quittin.1     Smokeless tobacco: Never Used   Substance and Sexual Activity     Alcohol use: Yes     Comment: 1 beer qd     Drug use: No     Sexual activity: Yes     Partners: Female   Lifestyle     Physical activity     Days per week: Not on file     Minutes per session: Not on file     Stress: Not on file   Relationships     Social connections     Talks on phone: Not on file     Gets together: Not on file     Attends Baptism service: Not on file     Active member of club or organization: Not on file     Attends meetings of clubs or organizations: Not on file     Relationship status: Not on file     Intimate partner violence     Fear of current or ex partner: Not on file     Emotionally abused: Not on file     Physically abused: Not on file     Forced sexual activity: Not on file   Other Topics Concern     Parent/sibling w/ CABG, MI or angioplasty before 65F 55M? Not Asked      Service Not Asked     Blood Transfusions Not Asked     Caffeine Concern Not Asked     Occupational Exposure Not Asked     Hobby Hazards Not Asked     Sleep Concern Not Asked     Stress Concern Not Asked     Weight Concern Not Asked     Special Diet Not Asked     Back Care Not Asked     Exercise Yes     Bike Helmet Not Asked     Seat Belt Not Asked     Self-Exams Not Asked   Social History Narrative     Not on file           EXAMINATION:  Gen:   No apparent distress  Neuro:    A&Ox3, no deficits  Psych:    Answering questions appropriately for age and situation with normal affect  Head:    NCAT  Eye:    Visual scanning without deficit  Ear:    Response to auditory stimuli wnl  Lung:    Non-labored breathing on RA noted  Abd:    NTND per patient report  Lymph:    Neg for pitting/non-pitting edema BLE  Vasc:    Pulses palpable, CFT minimally delayed  Neuro:    Light touch sensation diminished distally  Derm:    Callusing/subacute blood blisters plantar-medial R 1st MPJ without wound or SOI  MSK:    right lower extremity - prominent bunion noted, minimal pain noted on exam.  1st MPJ not reducible, somewhat stiff today.  Mild dorsal floating of 2nd toe, but no pain around 2nd MPJ.  Bony prominence near dorsal 3rd<4th MPJ  Calf:    Neg for redness, swelling or tenderness    Assessment:  75 year old male with painful callusing/blood blister in setting of pronounced bunion      Plan:  Discussed etiologies, anatomy and options  1.  Painful callusing/blood blister in setting of pronounced bunion  -debrided blood blisters to evaluate for wounds, none was found  -home callus instructions dispensed to address callusing  -advised comfortable accommodative shoe gear  -advised bunion padding and/or gel insert to provide pressure relief  -discussed surgery as next option if padding, shoe gear and callus instructions fail to provide relief.  Anticipate 1st MPJ fusion  -regarding lesser toes, he inquired as to whether the bunion would affect them.  The abduction of the hallux with likely cause compensatory digital deformities, but these are addressed as the symptoms dictate.  If a hammertoe is present but not bothersome, no specific treatment plan would be indicated.    Follow up:  prn or sooner with acute issues      Patient's medical history was reviewed today    Body mass index is 26.5 kg/m .  Weight management plan: Patient was referred to their PCP to discuss a diet and exercise plan.        David DUBON  VARUN Robledo FACFAS FACFAOM  Podiatric Foot & Ankle Surgeon  [facility]  [phone]

## 2020-08-18 NOTE — PATIENT INSTRUCTIONS
Thank you for choosing [facility] Podiatry / Foot & Ankle Surgery!    [facility] SCHEDULE SURGERY: [phone]   [address] BILLING QUESTIONS: [phone]   [place] [address] AFTER HOURS: [phone]   PH: [phone] [employer]ER [employer] LINE:[phone]   FAX: [phone] APPOINTMENTS: [phone]     BUNION (HALLUX ABDUCTO VALGUS)  A bunion is caused by muscle imbalance. The great toe is pulled toward the smaller toes. The metatarsal head is pushed outward creating a lump on the side of your foot. Imbalance is the result of foot structure and instability.   Bunions do not improve with time. They usually enlarge, however this is a fairly slow process. Shoes do not necessarily cause bunions, however, they can hasten development and definitely cause bunions to hurt.   Bunions often run in families. We inherit a certain foot structure, which may be predisposed to bunion development.   Bunion pain is likely a combination of shoes rubbing on the bump, nerve irritation, compression between the toes, joint misalignment, arthritis and altered gait.   SYMPTOMS   Bunions are usually termed mild, moderate or severe. Just because you have a bunion does not mean you have to have pain. There are some people with very severe bunions and no pain and people with mild bunions and a lot of pain.   - Pain on the inside of your foot at the big toe joint (1st MTPJ)   - Swelling on the inside of your foot at the big toe joint   - Redness on the inside of your foot at the big toe joint   - Numbness or burning in the big toe (hallux)   - Decreased motion at the big toe joint   - Painful bursa (fluid-filled sac) on the inside of your foot at the big toe joint   - Pain while wearing shoes -especially shoes too narrow or with high heels    - Pain during activities   - Corn in between the big toe and second toe   - Callous formation on the side or bottom of the big toe or big toe joint   - Callous  under the second toe joint (2nd MTPJ)   - Pain in the second toe joint   TREATMENT  Conservative (non-surgical) treatment will not make the bunion go away, but it will hopefully decrease the signs and symptoms you have and help you get rid of the pain and get you back to your activities.   1.  Wider shoes or extra depth shoes: Most bunion pain can be improved simply by wearing compatible shoes. People with bunions cannot choose footwear simply because they like the style. Your bunion should determine which shoes are to be worn. Wide shoes with nonirritating seams,soft leather and a square toe box are most compatible with a bunion. Shoes should fit appropriately right out of the box but may need to be professionally stretched and modified to accommodate the bump. Heels, dress shoes and shoes with pointed toes will not be comfortable.   2. NSAIDs   3.  Arch supports, custom inserts, padding, splints, toe spacers : Most bunion pain can be improved simply by wearing compatible shoes. People with bunions cannot choose footwear simply because they like the style. Your bunion should determine which shoes are to be worn. Wide shoes with nonirritating seams,soft leather and a square toe box are most compatible with a bunion. Shoes should fit appropriately right out of the box but may need to be professionally stretched and modified to accommodate the bump. Heels, dress shoes and shoes with pointed toes will not be comfortable.   4.  Change activities   5.  Physical therapy  SURGERY  Surgical treatment for bunions is sometimes needed. If you are limited by pain, cannot fit in shoes comfortably and are not able to do your daily activities then surgery may be a good option for you. There are many different surgical procedures to repair bunions. Your foot and ankle surgeon will review your foot exam findings, your x-rays, your age, your health, your lifestyle, your physical activity level and discuss with you which procedure he  or she would recommend. Surgical procedures for bunions range from soft tissue repair to cutting and realigning the bones. It is not recommended that you have bunion surgery for cosmetic reasons (you do not like how your foot looks) or because you want to fit in a certain pair of shoes; There is the risk that even after surgery, the bunion will reoccur 9-10% of the time.   Bunion surgery involves cutting and repositioning the bones surrounding the bunion. Pins and screws are used to hold the bones in place during the healing process. The goal of bunion surgery is to reduce the size of the bunion bump. Realignment of the toe and joint is attempted.     Some first toes cannot be forced back into normal alignment even with surgery. Surgery is helpful in most cases but does not necessarily create a normal foot.   Healing after surgery requires about six weeks of protection. This allows the bone to heal. Maximum recovery takes about one year. The scar tissue and jOint structures require this amount of time to finish the healing process. Expect stiffness, swelling and numbness during that time frame. Bunion surgery does involve side effects. Some side effects are predictable and others are less common but do occur. A scar will be visible and could be irritated by shoes. The shoe may rub on the screw or internal pin requiring surgical removal of these fixation devices. The screw and pin would likely be left in place for a full year. The first toe may loose motion after bunion surgery. The amount of stiffness is variable. Some people never regain normal motion of the first toe. This is due to scar tissue inherent to any surgery. The first toe may drift toward the second toe or away from the second toe. Spreading of the first and second toes is a rare occurrence after bunion surgery. This can be quite bothersome and would need to be surgically repaired. Toe drift toward the second toe could result in a recurrent bunion and  revision surgery. Joint fusion is one option to correct an unstable, drifting toe. This procedure straightens the toe, however, no motion remains. Fusion may be necessary to correct complications of bunion surgery or as the original procedure in severe cases.   All surgical procedures involve risk of infection, numbness, pain, delayed healing, osteotomy dislocation, blood clots, continued foot pain, etc. Bunion surgery is quite complex and should not be taken lightly.   Any skin incision can lead to infection. Deep infection might involve the bone and thus repeat surgery and six weeks of IV antibiotics. Scar tissue can cause nerve pain or numbness. This is generally temporary but can be permanent. We do not have treatments that cure nerve problems. Second toe pain could be related to altered mechanics and pressure transferred to the second toe. Most feet with bunions have pre-existing second toe problems. Delayed bone healing would lengthen the healing time. Some bones simply do not heal. This requires repeat surgery, electronic bone stimulation and/or extended protection. Smokers have an approximate 20% chance of poor bone healing. This is double that of a non-smoker. The bone cut may displace. This may need to be repaired with a second operation. Displacement can cause jOint malalignment. Immobility after surgery can cause blood clots in the legs and lungs. This could result in death.   Foot pain is complex. Most feet hurt for more than one reason. Fixing the bunion would not necessarily create a pain free foot. Appropriate shoes, healthy body weight, avoidance of bare foot walking and moderation of activity will always be necessary to enjoy foot comfort. Your bunion may involve arthritis, which is incurable even with surgery. Long standing bunions often involve chronic irritation to the surrounding nerves. Nerve pain may not resolve even with reducing the bunion bump since permanent nerve damage may be present    Bunion surgery is nevertheless quite successful. Most surgical patients are pleased with their foot following bunion surgery. Many of the issues described above can be controlled by taking proper care of your foot during the healing process.   Your surgeon would be happy to fully describe any of the above issues. You should pursue a full understanding of the operation,recovery process and any potential problems that could develop.   PREVENTION  1.  Do not wear high heels if there is a family history of bunions.  2.  Wear shoes that have enough width and depth in the toe box  Here are exercises that may benefit people with bunions:   Toe stretches - Stretching out your toes can help keep them limber and offset foot pain. To stretch your toes, point your toes straight ahead for 5 seconds and then curl them under for 5 seconds. Repeat these stretches 10 times. These exercises can be especially beneficial if you also have hammertoes, or chronically bent toes, in addition to a bunion.   Toe flexing and la - Press your toes against a hard surface such as a wall, to flex and stretch them; hold the position for 10 seconds and repeat three to four times. Then flex your toes in the opposite direction; hold the position for 10 seconds and repeat three to four times.   Stretching your big toe - Using your fingers to gently pull your big toe over into proper alignment can be helpful as well. Hold your toe in position for 10 seconds and repeat three to four times.   Resistance exercises - Wrap either a towel or belt around your big toe and use it to pull your big toe toward you while simultaneously pushing forward, against the towel, with your big toe.   Ball roll - To massage the bottom of your foot, sit down, place a golf ball on the floor under your foot, and roll it around under your foot for two minutes. This can help relieve foot strain and cramping.   Towel curls - You can strengthen your toes by spreading out a  small towel on the floor, curling your toes around it, and pulling it toward you. Repeat five times. Gripping objects with your toes like this can help keep your foot flexible.   Picking up marbles - Another gripping exercise you can perform to keep your foot flexible is picking up marbles with your toes. Do this by placing 20 marbles on the floor in front of you and use your foot to pick the marbles up one by one and place them in a bowl.   Walking along the beach - Whenever possible, spend time walking on sand. This can give you a gentle foot massage and also help strengthen your toes. This is especially beneficial for people who have arthritis associated with their bunions.     CALLUS / CORN / IPK CARE  When there is excessive friction or pressure on the skin, the body responds by making the skin thicker to protect the deeper structures from becoming exposed. While this works well to protect the deeper structures, the thickened skin can cause increased pressure and pain.    Callus: flat, diffuse thickened areas are simple calluses and they are usually caused by friction. Often these are the result of rubbing on a shoe or from going barefoot.    Corns: calluses with a central core on or between the toes are called corns. These result from prominent joints on toes rubbing together. Theses are a symptom of bone malalignment or illfitting shoes and will always recur unless the underlying bones are addressed surgically.    IPK: calluses with a central core on the ball of the foot are usually IPKs (intractable plantar keratosis). These are caused by excessive pressure from the metatarsals, the bones that make up the ball of the foot. Often one of these bones is too long or too prominent. Again, these will always recur unless the underlying bone issue is addressed. There is no cure for these. They will either go away by themselves, recur, or more could develop.    TREATMENT RECOMENDATIONS  - File: Trim them down with a  pumice stone or callus file a couple times a week to remove callus tissue that builds up. An electric callus removing device. Amope Pedi Perfect Electronic Pedicure Foot File and Callus Remover can be a good option.   - Moisturize: Lotion can be applied to soften the callus. A lactic acid or urea based cream such as Carmol, Kersal or Vanicream or thicker cream with shea butter are good options.   - Foot Gear: Good supportive shoes and minimizing barefoot walking can slow down callus formation and can decrease pain levels. Gel inserts can also provide padding to the bottom of the foot to prevent pain and slow recurrence. Toe spacers, toe covers, can custom orthotic inserts can be beneficial as well.  - Surgery: If there is a surgical pathology noted, such as a prominent bone, often this needs to be addressed surgically to minimize recurrence. However, sometimes the lesion simply migrates to another spot after surgery, so it is not a guaranteed cure.      [email or web address]-[email or web address]   1-800-PEDIFIX

## 2020-08-18 NOTE — LETTER
"    8/18/2020         RE: Mason Beatty  [address]  [place] [address]        Dear Colleague,    Thank you for referring your patient, Mason Beatty, to the [facility] PODI[facility]RY. Please see a copy of my visit note below.    Foot & Ankle Surgery  August 18, 2020    CC: \"bunion?\"    I was asked to see Mason GARRETT Rogerio regarding the chief complaint by:  Dr. DANAE Anthony    HPI:  Pt is a 75 year old male who presents with above complaint.  Right foot pain x years.  \"information/diagnosis/cure(?)\".  \"none usually\" for symptoms.  Points to plantar-medial R 1st MPJ, where he has callusing/blood blister formation in setting of prominent bunion.  \"good question\" when asked if the bump is painful or the callusing is the issue.  Seems to indicate the callusing/blood blister is more bothersome.  Has comfortable shoes.  No treatment otherwise noted.  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  The patient denies current nausea, vomiting, chills, fevers, belly pain, calf pain, chest pain or SOB.  Complete remainder of ROS is otherwise neg.    VITALS:    Vitals:    08/18/20 0829   BP: 120/64   Weight: 79.1 kg (174 lb 4.8 oz)   Height: 1.727 m (5' 8\")       PMH:    Past Medical History:   Diagnosis Date     Aortic aneurysm of unspecified site without mention of rupture      Nonspecific abnormal electrocardiogram (ECG) (EKG)      Scoliosis      Transverse myelitis (H)     2001       SXHX:    Past Surgical History:   Procedure Laterality Date     REPAIR ANEURYSM THORACOABDOMINAL      repair 2013, [facility]:    Current Outpatient Medications   Medication     acetaminophen (ACETAMINOPHEN EXTRA STRENGTH) 500 MG tablet     ASPIRIN 81 MG OR TABS     metoprolol succinate ER (TOPROL-XL) 25 MG 24 hr tablet     MULTI-VITAMIN OR TABS     No current facility-administered medications for this visit.        ALL:     Allergies   Allergen Reactions     Shellfish Allergy Swelling       FMH:    Family History "   Problem Relation Age of Onset     C.A.D. Maternal Grandmother         heart attack     Hypertension Mother      Cerebrovascular Disease Paternal Grandmother      Prostate Cancer Father      Cancer - colorectal No family hx of        SocHx:    Social History     Socioeconomic History     Marital status:      Spouse name: Not on file     Number of children: Not on file     Years of education: Not on file     Highest education level: Not on file   Occupational History     Not on file   Social Needs     Financial resource strain: Not on file     Food insecurity     Worry: Not on file     Inability: Not on file     Transportation needs     Medical: Not on file     Non-medical: Not on file   Tobacco Use     Smoking status: Former Smoker     Last attempt to quit: 1986     Years since quittin.1     Smokeless tobacco: Never Used   Substance and Sexual Activity     Alcohol use: Yes     Comment: 1 beer qd     Drug use: No     Sexual activity: Yes     Partners: Female   Lifestyle     Physical activity     Days per week: Not on file     Minutes per session: Not on file     Stress: Not on file   Relationships     Social connections     Talks on phone: Not on file     Gets together: Not on file     Attends Anabaptist service: Not on file     Active member of club or organization: Not on file     Attends meetings of clubs or organizations: Not on file     Relationship status: Not on file     Intimate partner violence     Fear of current or ex partner: Not on file     Emotionally abused: Not on file     Physically abused: Not on file     Forced sexual activity: Not on file   Other Topics Concern     Parent/sibling w/ CABG, MI or angioplasty before 65F 55M? Not Asked      Service Not Asked     Blood Transfusions Not Asked     Caffeine Concern Not Asked     Occupational Exposure Not Asked     Hobby Hazards Not Asked     Sleep Concern Not Asked     Stress Concern Not Asked     Weight Concern Not Asked      Special Diet Not Asked     Back Care Not Asked     Exercise Yes     Bike Helmet Not Asked     Seat Belt Not Asked     Self-Exams Not Asked   Social History Narrative     Not on file           EXAMINATION:  Gen:   No apparent distress  Neuro:   A&Ox3, no deficits  Psych:    Answering questions appropriately for age and situation with normal affect  Head:    NCAT  Eye:    Visual scanning without deficit  Ear:    Response to auditory stimuli wnl  Lung:    Non-labored breathing on RA noted  Abd:    NTND per patient report  Lymph:    Neg for pitting/non-pitting edema BLE  Vasc:    Pulses palpable, CFT minimally delayed  Neuro:    Light touch sensation diminished distally  Derm:    Callusing/subacute blood blisters plantar-medial R 1st MPJ without wound or SOI  MSK:    right lower extremity - prominent bunion noted, minimal pain noted on exam.  1st MPJ not reducible, somewhat stiff today.  Mild dorsal floating of 2nd toe, but no pain around 2nd MPJ.  Bony prominence near dorsal 3rd<4th MPJ  Calf:    Neg for redness, swelling or tenderness    Assessment:  75 year old male with painful callusing/blood blister in setting of pronounced bunion      Plan:  Discussed etiologies, anatomy and options  1.  Painful callusing/blood blister in setting of pronounced bunion  -debrided blood blisters to evaluate for wounds, none was found  -home callus instructions dispensed to address callusing  -advised comfortable accommodative shoe gear  -advised bunion padding and/or gel insert to provide pressure relief  -discussed surgery as next option if padding, shoe gear and callus instructions fail to provide relief.  Anticipate 1st MPJ fusion  -regarding lesser toes, he inquired as to whether the bunion would affect them.  The abduction of the hallux with likely cause compensatory digital deformities, but these are addressed as the symptoms dictate.  If a hammertoe is present but not bothersome, no specific treatment plan would be  indicated.    Follow up:  prn or sooner with acute issues      Patient's medical history was reviewed today    Body mass index is 26.5 kg/m .  Weight management plan: Patient was referred to their PCP to discuss a diet and exercise plan.        David Robledo DPM [facility] FACFAOM  Podiatric Foot & Ankle Surgeon  [facility]  [phone]      Again, thank you for allowing me to participate in the care of your patient.        Sincerely,        David Robledo DPM, VARUN

## 2020-08-24 ENCOUNTER — TRANSFERRED RECORDS (OUTPATIENT)
Dept: HEALTH INFORMATION MANAGEMENT | Facility: CLINIC | Age: 75
End: 2020-08-24

## 2020-08-28 ENCOUNTER — HOSPITAL ENCOUNTER (OUTPATIENT)
Dept: CARDIOLOGY | Facility: CLINIC | Age: 75
Discharge: HOME | End: 2020-08-28
Attending: INTERNAL MEDICINE | Admitting: INTERNAL MEDICINE
Payer: MEDICARE

## 2020-08-28 DIAGNOSIS — Q23.81 BICUSPID AORTIC VALVE: ICD-10-CM

## 2020-08-28 DIAGNOSIS — Z95.828 HX OF ASCENDING AORTA REPLACEMENT: ICD-10-CM

## 2020-08-28 DIAGNOSIS — I35.1 AORTIC VALVE INSUFFICIENCY, ETIOLOGY OF CARDIAC VALVE DISEASE UNSPECIFIED: ICD-10-CM

## 2020-08-28 PROCEDURE — 93306 TTE W/DOPPLER COMPLETE: CPT

## 2020-08-28 PROCEDURE — 93306 TTE W/DOPPLER COMPLETE: CPT | Mod: 26 | Performed by: INTERNAL MEDICINE

## 2020-09-01 ENCOUNTER — TELEPHONE (OUTPATIENT)
Dept: PEDIATRICS | Facility: CLINIC | Age: 75
End: 2020-09-01

## 2020-09-01 NOTE — TELEPHONE ENCOUNTER
Patient Quality Outreach      Summary:    Patient is due/failing the following:   Annual wellness, date due: now    Type of outreach:    Sent [employer] message.    Questions for provider review:    None                                                                                   **Start Working phrase here:**       Patient has the following on his problem list/HM:                                                 MARY ELLEN Gates

## 2020-09-08 ENCOUNTER — HOSPITAL ENCOUNTER (OUTPATIENT)
Dept: CARDIOLOGY | Facility: CLINIC | Age: 75
Discharge: HOME | End: 2020-09-08
Attending: INTERNAL MEDICINE | Admitting: INTERNAL MEDICINE
Payer: MEDICARE

## 2020-09-08 VITALS — HEART RATE: 60 BPM | SYSTOLIC BLOOD PRESSURE: 129 MMHG | DIASTOLIC BLOOD PRESSURE: 84 MMHG

## 2020-09-08 DIAGNOSIS — Z95.828 HX OF ASCENDING AORTA REPLACEMENT: ICD-10-CM

## 2020-09-08 LAB
CREAT BLD-MCNC: 1.2 MG/DL (ref 0.66–1.25)
GFR SERPL CREATININE-BSD FRML MDRD: 59 ML/MIN/{1.73_M2}

## 2020-09-08 PROCEDURE — 25000128 H RX IP 250 OP 636: Performed by: INTERNAL MEDICINE

## 2020-09-08 PROCEDURE — 82565 ASSAY OF CREATININE: CPT

## 2020-09-08 PROCEDURE — 71275 CT ANGIOGRAPHY CHEST: CPT

## 2020-09-08 RX ORDER — ACYCLOVIR 200 MG/1
0-1 CAPSULE ORAL
Status: DISCONTINUED | OUTPATIENT
Start: 2020-09-08 | End: 2020-09-09 | Stop reason: HOSPADM

## 2020-09-08 RX ORDER — IOPAMIDOL 755 MG/ML
500 INJECTION, SOLUTION INTRAVASCULAR ONCE
Status: COMPLETED | OUTPATIENT
Start: 2020-09-08 | End: 2020-09-08

## 2020-09-08 RX ORDER — DIPHENHYDRAMINE HCL 25 MG
25 CAPSULE ORAL
Status: DISCONTINUED | OUTPATIENT
Start: 2020-09-08 | End: 2020-09-09 | Stop reason: HOSPADM

## 2020-09-08 RX ORDER — METHYLPREDNISOLONE SODIUM SUCCINATE 125 MG/2ML
125 INJECTION, POWDER, LYOPHILIZED, FOR SOLUTION INTRAMUSCULAR; INTRAVENOUS
Status: DISCONTINUED | OUTPATIENT
Start: 2020-09-08 | End: 2020-09-09 | Stop reason: HOSPADM

## 2020-09-08 RX ORDER — ONDANSETRON 2 MG/ML
4 INJECTION INTRAMUSCULAR; INTRAVENOUS
Status: DISCONTINUED | OUTPATIENT
Start: 2020-09-08 | End: 2020-09-09 | Stop reason: HOSPADM

## 2020-09-08 RX ORDER — DIPHENHYDRAMINE HYDROCHLORIDE 50 MG/ML
25-50 INJECTION INTRAMUSCULAR; INTRAVENOUS
Status: DISCONTINUED | OUTPATIENT
Start: 2020-09-08 | End: 2020-09-09 | Stop reason: HOSPADM

## 2020-09-08 RX ADMIN — IOPAMIDOL 100 ML: 755 INJECTION, SOLUTION INTRAVENOUS at 16:10

## 2020-09-09 ENCOUNTER — TELEPHONE (OUTPATIENT)
Dept: PEDIATRICS | Facility: CLINIC | Age: 75
End: 2020-09-09

## 2020-09-09 LAB — RADIOLOGIST FLAGS: ABNORMAL

## 2020-09-09 NOTE — TELEPHONE ENCOUNTER
General Call:     Who is calling:  Dakota     Reason for Call:  Relay urgent CT scan results     What are your questions or concerns:  Caller needs to talk with RN to relay urgent radiology findings of CT scan of chest     Date of last appointment with provider: unknown    Okay to leave a detailed message:Yes at [phone]

## 2020-09-09 NOTE — TELEPHONE ENCOUNTER
Rebecca from [facility] calling.     4 mm nodule in left lower lobe.     Recommendations for one or multiple incidental lung nodules < 6 mm:    Low risk patients: No routine follow-up.    High risk patients: Optional follow-up CT at 12 months; if  unchanged, no further follow-up.     *Low Risk: Minimal or absent history of smoking or other known risk  factors.  *Nonsolid (ground glass) or partly solid nodules may require longer  follow-up to exclude indolent adenocarcinoma.  *Recommendations based on Guidelines for the Management of Incidental  Pulmonary Nodules Detected at CT: From the Fleischner Society 2017,  Radiology 2017.  Carlee Tatum RN on 9/9/2020 at 5:05 PM

## 2020-09-14 ENCOUNTER — MYC MEDICAL ADVICE (OUTPATIENT)
Dept: PEDIATRICS | Facility: CLINIC | Age: 75
End: 2020-09-14

## 2020-09-15 ENCOUNTER — TRANSFERRED RECORDS (OUTPATIENT)
Dept: HEALTH INFORMATION MANAGEMENT | Facility: CLINIC | Age: 75
End: 2020-09-15

## 2020-09-17 ENCOUNTER — TRANSFERRED RECORDS (OUTPATIENT)
Dept: HEALTH INFORMATION MANAGEMENT | Facility: CLINIC | Age: 75
End: 2020-09-17

## 2020-10-15 ENCOUNTER — TRANSFERRED RECORDS (OUTPATIENT)
Dept: HEALTH INFORMATION MANAGEMENT | Facility: CLINIC | Age: 75
End: 2020-10-15

## 2021-01-15 ENCOUNTER — HEALTH MAINTENANCE LETTER (OUTPATIENT)
Age: 76
End: 2021-01-15

## 2021-03-09 ENCOUNTER — MYC MEDICAL ADVICE (OUTPATIENT)
Dept: PEDIATRICS | Facility: CLINIC | Age: 76
End: 2021-03-09

## 2021-03-09 NOTE — TELEPHONE ENCOUNTER
Please see [employer] message.Carlee Tatum RN on 3/9/2021 at 12:21 PM          Terry Mcmanus MD  to Mason Beatty E        9/14/20 4:06 PM  Mason, agustin notify you that there was an incidental finding on your recent CT scan.  Dr. Anthony may have previously notified you.  The scan demonstrated a 4 mm pulmonary nodule in the left lung.  These lung growths are relatively common and based on its small size, the recommendation is repeating your scan after about 12 months.  Please follow-up with us in about 6 months and we can get the scan arranged for you.  Dr Mcmanus

## 2021-04-27 ENCOUNTER — TRANSFERRED RECORDS (OUTPATIENT)
Dept: HEALTH INFORMATION MANAGEMENT | Facility: CLINIC | Age: 76
End: 2021-04-27

## 2021-05-24 ENCOUNTER — TRANSFERRED RECORDS (OUTPATIENT)
Dept: HEALTH INFORMATION MANAGEMENT | Facility: CLINIC | Age: 76
End: 2021-05-24

## 2021-06-14 DIAGNOSIS — Z95.828 HX OF ASCENDING AORTA REPLACEMENT: ICD-10-CM

## 2021-06-15 RX ORDER — METOPROLOL SUCCINATE 25 MG/1
TABLET, EXTENDED RELEASE ORAL
Qty: 45 TABLET | Refills: 0 | Status: SHIPPED | OUTPATIENT
Start: 2021-06-15 | End: 2021-06-21

## 2021-06-15 NOTE — TELEPHONE ENCOUNTER
Patient has upcoming visit w/ PCP. Prescription approved per [facility] Refill Protocol.  Mikal OLIVA RN

## 2021-06-21 ENCOUNTER — OFFICE VISIT (OUTPATIENT)
Dept: PEDIATRICS | Facility: CLINIC | Age: 76
End: 2021-06-21
Payer: MEDICARE

## 2021-06-21 VITALS
OXYGEN SATURATION: 98 % | WEIGHT: 173 LBS | DIASTOLIC BLOOD PRESSURE: 60 MMHG | HEART RATE: 64 BPM | RESPIRATION RATE: 16 BRPM | TEMPERATURE: 98.2 F | HEIGHT: 68 IN | BODY MASS INDEX: 26.22 KG/M2 | SYSTOLIC BLOOD PRESSURE: 108 MMHG

## 2021-06-21 DIAGNOSIS — D17.30 LIPOMA OF SKIN AND SUBCUTANEOUS TISSUE: ICD-10-CM

## 2021-06-21 DIAGNOSIS — Z95.828 HX OF ASCENDING AORTA REPLACEMENT: ICD-10-CM

## 2021-06-21 DIAGNOSIS — Z01.818 PREOP GENERAL PHYSICAL EXAM: Primary | ICD-10-CM

## 2021-06-21 PROCEDURE — 99214 OFFICE O/P EST MOD 30 MIN: CPT | Performed by: INTERNAL MEDICINE

## 2021-06-21 PROCEDURE — 93000 ELECTROCARDIOGRAM COMPLETE: CPT | Performed by: INTERNAL MEDICINE

## 2021-06-21 RX ORDER — METOPROLOL SUCCINATE 25 MG/1
TABLET, EXTENDED RELEASE ORAL
Qty: 45 TABLET | Refills: 3 | Status: SHIPPED | OUTPATIENT
Start: 2021-06-21 | End: 2022-08-02

## 2021-06-21 ASSESSMENT — MIFFLIN-ST. JEOR: SCORE: 1504.22

## 2021-06-21 NOTE — PATIENT INSTRUCTIONS

## 2021-06-21 NOTE — PROGRESS NOTES
[facility]  [address]  [address]  [place] [address]  Phone: [phone]  Fax: [phone]  Primary Provider: Ko Mcmanus  Pre-op Performing Provider: KO MCMANUS      PREOPERATIVE EVALUATION:  Today's date: 6/21/2021    Mason Beatty is a 75 year old male who presents for a preoperative evaluation.    Surgical Information:  Surgery/Procedure: excision of lipoma  Surgery Location: [facility]  Surgeon:   Surgery Date: 6/30/21  Time of Surgery: am  Where patient plans to recover: At home with family  Fax number for surgical facility: [phone] and [phone]    Type of Anesthesia Anticipated: General    Assessment & Plan     The proposed surgical procedure is considered LOW risk.    (Z01.818) Preop general physical exam  (primary encounter diagnosis)  Comment:   Plan: EKG 12-lead complete w/read - Clinics          (D17.30) Lipoma of skin and subcutaneous tissue    (Z95.828) Hx of ascending aorta replacement  Comment:   Plan: metoprolol succinate ER (TOPROL-XL) 25 MG 24 hr        tablet            Subjective     HPI related to upcoming procedure:   Mason Beatty is a 75 year old who presents for preoperative evaluation as requested by Dr Morales prior to excision lipoma.     Preop Questions 6/19/2021   1. Have you ever had a heart attack or stroke? No   2. Have you ever had surgery on your heart or blood vessels, such as a stent placement, a coronary artery bypass, or surgery on an artery in your head, neck, heart, or legs? YES -    3. Do you have chest pain with activity? No   4. Do you have a history of  heart failure? No   5. Do you currently have a cold, bronchitis or symptoms of other infection? No   6. Do you have a cough, shortness of breath, or wheezing? No   7. Do you or anyone in your family have previous history of blood clots? No   8. Do you or does anyone in your family have a serious bleeding problem such  as prolonged bleeding following surgeries or cuts? No   9. Have you ever had problems with anemia or been told to take iron pills? No   10. Have you had any abnormal blood loss such as black, tarry or bloody stools? No   11. Have you ever had a blood transfusion? YES -    11a. Have you ever had a transfusion reaction? No   12. Are you willing to have a blood transfusion if it is medically needed before, during, or after your surgery? Yes   13. Have you or any of your relatives ever had problems with anesthesia? No   14. Do you have sleep apnea, excessive snoring or daytime drowsiness? No   15. Do you have any artifical heart valves or other implanted medical devices like a pacemaker, defibrillator, or continuous glucose monitor? YES -    15a. What type of device do you have? Ascending aorta replacement   15b. Name of the clinic that manages your device:  None   16. Do you have artificial joints? No   17. Are you allergic to latex? No     Health Care Directive:  Patient does not have a Health Care Directive or Living Will: Advance Directive received and scanned. Click on Code in the patient header to view.    Preoperative Review of :   reviewed - no record of controlled substances prescribed.      Review of Systems  Constitutional, neuro, ENT, endocrine, pulmonary, cardiac, gastrointestinal, genitourinary, musculoskeletal, integument and psychiatric systems are negative, except as otherwise noted.    Patient Active Problem List    Diagnosis Date Noted     Scoliosis, unspecified scoliosis type, unspecified spinal region 06/09/2020     Priority: Medium     Chronic lumbar radiculopathy 09/13/2016     Priority: Medium     DDD (degenerative disc disease), thoracolumbar 08/04/2015     Priority: Medium     Tubular adenoma of colon 08/02/2015     Priority: Medium     Pulmonary nodule 10/07/2014     Priority: Medium     Bicuspid aortic valve      Priority: Medium     Hx of ascending aorta replacement 12/02/2013      "Priority: Medium     26 mm Hemashield Dacron graft       Advanced directives, counseling/discussion 2013     Priority: Low     10/21/14 Advance Directive Invitation mailed to patient. AD Calhoun RN          Past Medical History:   Diagnosis Date     Aortic aneurysm of unspecified site without mention of rupture      Nonspecific abnormal electrocardiogram (ECG) (EKG)      Scoliosis      Transverse myelitis (H)          Past Surgical History:   Procedure Laterality Date     REPAIR ANEURYSM THORACOABDOMINAL      repair , [place]     Current Outpatient Medications   Medication Sig Dispense Refill     acetaminophen (ACETAMINOPHEN EXTRA STRENGTH) 500 MG tablet Take 1-2 tablets (500-1,000 mg) by mouth every 6 hours as needed for pain       ASPIRIN 81 MG OR TABS 1 TABLET DAILY       metoprolol succinate ER (TOPROL-XL) 25 MG 24 hr tablet TAKE 1/2 TABLET BY MOUTH ONCE DAILY 45 tablet 0     MULTI-VITAMIN OR TABS None Entered         Allergies   Allergen Reactions     Shellfish Allergy Swelling        Social History     Tobacco Use     Smoking status: Former Smoker     Quit date: 1986     Years since quittin.9     Smokeless tobacco: Never Used   Substance Use Topics     Alcohol use: Yes     Comment: 1 beer qd       History   Drug Use No         Objective     /60 (Cuff Size: Adult Regular)   Pulse 64   Temp 98.2  F (36.8  C) (Tympanic)   Resp 16   Ht 1.727 m (5' 8\")   Wt 78.5 kg (173 lb)   SpO2 98%   BMI 26.30 kg/m      Physical Exam    GENERAL APPEARANCE: healthy, alert and no distress     EYES: EOMI,  PERRL     HENT: ear canals and TM's normal and nose and mouth without ulcers or lesions     NECK: no adenopathy, no asymmetry, masses, or scars and thyroid normal to palpation     RESP: lungs clear to auscultation - no rales, rhonchi or wheezes     CV: regular rates and rhythm, normal S1 S2, no S3 or S4 and no murmur, click or rub     ABDOMEN:  soft, nontender, no HSM or masses and bowel sounds " normal     MS: extremities normal- no gross deformities noted, no evidence of inflammation in joints, FROM in all extremities.     SKIN: large subcutaneous mass upper back     NEURO: Normal strength and tone, sensory exam grossly normal, mentation intact and speech normal     PSYCH: mentation appears normal. and affect normal/bright     LYMPHATICS: No cervical adenopathy      Diagnostics:     EKG: appears normal, NSR, normal axis, normal intervals, no acute ST/T changes c/w ischemia, no LVH by voltage criteria    Revised Cardiac Risk Index (RCRI):  The patient has the following serious cardiovascular risks for perioperative complications:   - No serious cardiac risks = 0 points     RCRI Interpretation: 0 points: Class I (very low risk - 0.4% complication rate)           Signed Electronically by: Terry Mcmanus MD  Copy of this evaluation report is provided to requesting physician.

## 2021-09-03 ENCOUNTER — MYC MEDICAL ADVICE (OUTPATIENT)
Dept: PEDIATRICS | Facility: CLINIC | Age: 76
End: 2021-09-03

## 2021-09-03 DIAGNOSIS — R91.8 PULMONARY NODULES: Primary | ICD-10-CM

## 2021-09-04 ENCOUNTER — HEALTH MAINTENANCE LETTER (OUTPATIENT)
Age: 76
End: 2021-09-04

## 2021-09-09 ENCOUNTER — HOSPITAL ENCOUNTER (OUTPATIENT)
Dept: CT IMAGING | Facility: CLINIC | Age: 76
Discharge: HOME | End: 2021-09-09
Attending: INTERNAL MEDICINE | Admitting: INTERNAL MEDICINE
Payer: MEDICARE

## 2021-09-09 DIAGNOSIS — R91.8 PULMONARY NODULES: ICD-10-CM

## 2021-09-09 PROCEDURE — 71250 CT THORAX DX C-: CPT | Mod: ME

## 2021-09-13 ENCOUNTER — OFFICE VISIT (OUTPATIENT)
Dept: PEDIATRICS | Facility: CLINIC | Age: 76
End: 2021-09-13
Payer: MEDICARE

## 2021-09-13 VITALS
RESPIRATION RATE: 22 BRPM | SYSTOLIC BLOOD PRESSURE: 108 MMHG | HEART RATE: 65 BPM | TEMPERATURE: 97.8 F | OXYGEN SATURATION: 96 % | DIASTOLIC BLOOD PRESSURE: 64 MMHG | BODY MASS INDEX: 26.7 KG/M2 | WEIGHT: 176.2 LBS | HEIGHT: 68 IN

## 2021-09-13 DIAGNOSIS — Z23 NEED FOR DIPHTHERIA-TETANUS-PERTUSSIS (TDAP) VACCINE: ICD-10-CM

## 2021-09-13 DIAGNOSIS — R56.9 SEIZURE (H): Primary | ICD-10-CM

## 2021-09-13 DIAGNOSIS — Z23 NEED FOR PROPHYLACTIC VACCINATION AND INOCULATION AGAINST INFLUENZA: ICD-10-CM

## 2021-09-13 PROCEDURE — 90472 IMMUNIZATION ADMIN EACH ADD: CPT | Performed by: INTERNAL MEDICINE

## 2021-09-13 PROCEDURE — 90662 IIV NO PRSV INCREASED AG IM: CPT | Performed by: INTERNAL MEDICINE

## 2021-09-13 PROCEDURE — G0008 ADMIN INFLUENZA VIRUS VAC: HCPCS | Performed by: INTERNAL MEDICINE

## 2021-09-13 PROCEDURE — 90715 TDAP VACCINE 7 YRS/> IM: CPT | Performed by: INTERNAL MEDICINE

## 2021-09-13 PROCEDURE — 99213 OFFICE O/P EST LOW 20 MIN: CPT | Mod: 25 | Performed by: INTERNAL MEDICINE

## 2021-09-13 ASSESSMENT — MIFFLIN-ST. JEOR: SCORE: 1513.74

## 2021-09-13 NOTE — PROGRESS NOTES
Assessment & Plan     (R56.9) Seizure (H)  (primary encounter diagnosis)  Comment: Suspicion for first seizure, although hospital work-up was negative.  Continued suspicion for seizure given presentation.  Referred to neurology for further work-up possible 24-hour EEG.  Patient is instructed not to drive for the next few months until neurology visit  Plan: Adult Neurology Referral          (Z23) Need for diphtheria-tetanus-pertussis (Tdap) vaccine  Comment:   Plan: TDAP VACCINE (Adacel, Boostrix)  [[id number]]          (Z23) Need for prophylactic vaccination and inoculation against influenza  Comment:   Plan: INFLUENZA, QUAD, HIGH DOSE, PF, 65YR + (FLUZONE        HD)            Return in about 6 months (around 3/13/2022) for follow-up visit.    Terry Mcmanus MD  [facility] [place] is a 76 year old who presents for the following health issues     History of Present Illness   He consumes 3 sweetened beverage(s) daily.He exercises with enough effort to increase his heart rate 20 to 29 minutes per day.    He is taking medications regularly.       Pt has had this appointment on the books for a while, would like to look at other preventative health topics while here if possible.    Hospital Follow-up Visit:    Hospital/Nursing Home/IP Rehab Facility: [facility]  Date of Admission: Aug 31, 2021  Date of Discharge: Sept 1, 2021  Reason(s) for Admission: Seizure      Was your hospitalization related to COVID-19? No   Problems taking medications regularly:  None  Medication changes since discharge: None  Problems adhering to non-medication therapy:  None    Summary of hospitalization:  [facility] discharge summary reviewed    76-year-old male with a history of prior aortic valve and ascending aortic replacement presents today for follow-up of recent hospitalization following possible first seizure.  Patient presented to the ED after his wife woke up at night to find  him lying on the couch snoring heavily.  She was unable to wake him, called EMS.  As per EMS patient was agitated at the time, was incontinent and exam demonstrated a tongue bite marks suggesting possible seizure.  Patient returned to his baseline mental status while in the emergency department.  He was admitted for further testing:  Testing: EEG normal, MRI demonstrated age-related changes, chronic microangiopathy without acute intracranial abnormality.  Echocardiogram demonstrated normal ejection fraction without valvular abnormalities or wall motion abnormalities.    Diagnostic Tests/Treatments reviewed.  Follow up needed: none  Other Healthcare Providers Involved in Patient s Care:         None  Update since discharge: improved.  Since discharge, patient has had no further episodes.  At discharge she had been instructed to stop driving but unfortunately has driven since then.  Discharge summary notes neurology referral however patient was unaware of that.           Post Discharge Medication Reconciliation: discharge medications reconciled, continue medications without change.  Plan of care communicated with patient              Patient Active Problem List   Diagnosis     Advanced directives, counseling/discussion     Hx of ascending aorta replacement     Bicuspid aortic valve     Tubular adenoma of colon     DDD (degenerative disc disease), thoracolumbar     Chronic lumbar radiculopathy     Scoliosis, unspecified scoliosis type, unspecified spinal region     Current Outpatient Medications   Medication Sig Dispense Refill     metoprolol succinate ER (TOPROL-XL) 25 MG 24 hr tablet TAKE 1/2 TABLET BY MOUTH ONCE DAILY 45 tablet 3     MULTI-VITAMIN OR TABS None Entered          Review of Systems   Constitutional, HEENT, cardiovascular, pulmonary, gi and gu systems are negative, except as otherwise noted.      Objective    /64 (BP Location: Right arm, Patient Position: Sitting, Cuff Size: Adult Regular)   Pulse  "65   Temp 97.8  F (36.6  C) (Tympanic)   Resp 22   Ht 1.727 m (5' 8\")   Wt 79.9 kg (176 lb 3.2 oz)   SpO2 96%   BMI 26.79 kg/m    Body mass index is 26.79 kg/m .  Physical Exam   GENERAL: healthy, alert and no distress  HENT: ear canals and TM's normal, nose and mouth without ulcers or lesions  NECK: no adenopathy, no asymmetry, masses, or scars and thyroid normal to palpation  RESP: lungs clear to auscultation - no rales, rhonchi or wheezes  CV: regular rate and rhythm, normal S1 S2, no S3 or S4, no murmur, click or rub, no peripheral edema and peripheral pulses strong  MS: no gross musculoskeletal defects noted, no edema  NEURO: Normal strength and tone, mentation intact and speech normal  PSYCH: mentation appears normal, affect normal/bright    "

## 2021-09-14 ENCOUNTER — MYC MEDICAL ADVICE (OUTPATIENT)
Dept: PEDIATRICS | Facility: CLINIC | Age: 76
End: 2021-09-14

## 2021-09-14 DIAGNOSIS — R56.9 SEIZURE (H): Primary | ICD-10-CM

## 2021-09-28 ENCOUNTER — OFFICE VISIT (OUTPATIENT)
Dept: NEUROLOGY | Facility: CLINIC | Age: 76
End: 2021-09-28
Attending: INTERNAL MEDICINE
Payer: MEDICARE

## 2021-09-28 VITALS
HEIGHT: 68 IN | WEIGHT: 175.2 LBS | OXYGEN SATURATION: 95 % | SYSTOLIC BLOOD PRESSURE: 135 MMHG | HEART RATE: 58 BPM | BODY MASS INDEX: 26.55 KG/M2 | DIASTOLIC BLOOD PRESSURE: 78 MMHG

## 2021-09-28 DIAGNOSIS — R56.9 FIRST TIME SEIZURE (H): Primary | ICD-10-CM

## 2021-09-28 DIAGNOSIS — R20.2 BILATERAL LEG PARESTHESIA: ICD-10-CM

## 2021-09-28 PROCEDURE — G0463 HOSPITAL OUTPT CLINIC VISIT: HCPCS

## 2021-09-28 PROCEDURE — 99205 OFFICE O/P NEW HI 60 MIN: CPT | Performed by: PSYCHIATRY & NEUROLOGY

## 2021-09-28 RX ORDER — MULTIVIT-MIN/IRON/FOLIC ACID/K 18-600-40
CAPSULE ORAL DAILY
COMMUNITY

## 2021-09-28 RX ORDER — LEVETIRACETAM 500 MG/1
TABLET ORAL
Qty: 182 TABLET | Refills: 1 | Status: SHIPPED | OUTPATIENT
Start: 2021-09-28 | End: 2022-03-29

## 2021-09-28 ASSESSMENT — MIFFLIN-ST. JEOR: SCORE: 1509.2

## 2021-09-28 NOTE — LETTER
9/28/2021         RE: Mason Beatty  [address] [place] [address]        Dear Colleague,    Thank you for referring your patient, Mason Beatty, to the [facility] [place]. Please see a copy of my visit note below.    INITIAL NEUROLOGY CONSULTATION    DATE OF VISIT: 9/28/2021  CLINIC LOCATION: [facility]  MRN: [id number]  PATIENT NAME: Mason Beatty  YOB: 1945    PRIMARY CARE PROVIDER: Terry Mcmanus MD     REASON FOR VISIT:   Chief Complaint   Patient presents with     Follow Up     after ER (possible seizure)      HISTORY OF PRESENT ILLNESS:                                                    Mr. Mason Beatty is 76 year old right handed male patient with past medical history of lumbar radiculopathy, scoliosis, aortic aneurysm, and transverse myelitis (2001), who was seen in consultation today requested by Terry Mcmanus MD, for suspected first-time seizure.  The patient is accompanied by his wife, who participates in the interview.    Per patient's/his wife's report and review of past medical records, on 8/31/2021 he was found by his wife unresponsive with abnormal breathing/heavy snoring.  She called ambulance and started CPR on the advice of .  Upon paramedics arrival, the patient was agitated and confused according to the notes, and 2 mg of Ativan IM were given, although per wife's perception he was just groggy and unsure of what happened.  He was incontinent of urine and had superficial fady on the left side of the tongue.  Was admitted to [facility] for further evaluation.  In the emergency room he returned back to his mental status baseline.  Head CT was negative.  Lactate was elevated to 4.49.  The patient had no recollection of the prior event.  Head CT was negative.  Portable EEG from 9/1/2021 was normal in awake and drowsy states.  Brain MRI with and without contrast from 8/31/2021 demonstrated  chronic microvascular changes without additional abnormal findings, including no structural changes in both temporal lobes.  TTE was normal.  Was seen by inpatient neurologist.  No AED was initiated.    At the present time, the patient denies any seizure recurrence.  He did not have any prior history of seizures or epilepsy.  His birth and childhood development were unremarkable.  He denies prior history of significant head injuries, strokes, or CNS infections, though mentions that he played soccer at school and bumped his head.  There is no family history of epilepsy or seizures.    No additional useful information is available in Care Everywhere, which was reviewed.    Review of Systems - the patient endorses restlessness, bilateral lower extremity paresthesia, and urinary retention.  All of them were previously discussed with other medical providers.  Otherwise, he denies any other complaints on 14-point comprehensive review of systems.  PAST MEDICAL/SURGICAL HISTORY:                                                    I personally reviewed patient's past medical and surgical history with the patient at today's visit.  MEDICATIONS:                                                    I personally reviewed patient's medications and allergies with the patient at today's visit.  metoprolol succinate ER (TOPROL-XL) 25 MG 24 hr tablet, TAKE 1/2 TABLET BY MOUTH ONCE DAILY  MULTI-VITAMIN OR TABS, None Entered    No current facility-administered medications on file prior to visit.    ALLERGIES:                                                      Allergies   Allergen Reactions     Shellfish Allergy Swelling     FAMILY/SOCIAL HISTORY:                                                    Family and social history was reviewed with the patient at today's visit.  No family history of neurological disorders, except stroke (paternal grandmother).  Former smoker, quit in 1986.  1 beer per day.  Denies use of recreational  "drugs.  .  Retired  of 30 years.  REVIEW OF SYSTEMS:                                                    Patient has completed a [facility] Patient Health History, including a 14-system review, which was personally reviewed, and pertinent positives are listed in HPI. He denies any additional problems on the further questioning.  EXAM:                                                    VITAL SIGNS:   /78 (BP Location: Right leg, Patient Position: Chair)   Pulse 58   Ht 1.727 m (5' 8\")   Wt 79.5 kg (175 lb 3.2 oz)   SpO2 95%   BMI 26.64 kg/m    Mini-Cog Assessment:  Mini Cog Assessment  Clock Draw Score: 2 Normal  3 Item Recall: 3 objects recalled  Mini Cog Total Score: 5  Administered by: : Regi AVENDANO    General: pt is in NAD, cooperative.  Skin: normal turgor, moist mucous membranes, no lesions/rashes noticed.  HEENT: ATNC, EOMI, PERRL, white sclera, normal conjunctiva, no nystagmus or ptosis. No carotid bruits bilaterally.  Respiratory: lung sounds clear to auscultation bilaterally, no crackles, wheezes, rhonchi. Symmetric lung excursion, no accessory respiratory muscle use.  Cardiovascular: normal S1/S2, no murmurs/rubs/gallops.   Abdomen: Not distended.  : deferred.    Neurological:  Mental: alert, follows commands, Mini Cog Total Score: 5/5 with 3/3 on memory recall, no aphasia or dysarthria. Fund of knowledge is appropriate for age.  Cranial Nerves:  CN II: visual acuity - able to accurately count fingers with each eye. Visual fields intact, fundi: discs sharp, no papilledema and normal vessels bilaterally.  CN III, IV, VI: EOM intact, pupils equal and reactive  CN V: facial sensation nl  CN VII: face symmetric, no facial droop  CN VIII: hearing normal  CN IX: palate elevation symmetric, uvula at midline  CN XI SCM normal, shoulder shrug nl  CN XII: tongue midline  Motor: Strength: 5/5 in all major groups of all extremities. Normal tone. No abnormal movements. No pronator drift " b/l.  Reflexes: Triceps, biceps, and brachioradialis reflexes are normal and symmetric, patellar and achilles reflexes are on the brisker side, but symmetric. No clonus noted. Toes are equivocal b/l.   Sensory:  light touch is intact, pinprick is reduced in the right foot, and vibration is reduced in both lower extremity up to his knees.  Proprioception is affected in the right foot.  Romberg: Equivocal.  Coordination: FNF and heel-shin tests intact b/l.   Gait:  Normal casual gait, but has difficulty with tandem.  DATA:   LABS/EEG/IMAGING/OTHER STUDIES: I reviewed pertinent medical records, including Care Everywhere, as detailed in the history of present illness.  ASSESSMENT AND PLAN:      ASSESSMENT: [place] Beatty is a 76 year old male patient with listed above past medical history, who presents with loss of consciousness spell that is suspicious for first-time seizure.    We had a detailed discussion with the patient and his wife regarding his presenting complaints.  The clinical presentation is most likely consistent with seizure rather than convulsive syncope, though it is also included in the differential.  Initial evaluation at [facility] is unrevealing.  I discussed potential additional testing of 24-hour ambulatory EEG and recommendations regarding AED.  We decided to start Keppra after review of typical side effects of increased irritability, mood swings, psychosis, dizziness, depression worsening, and fatigue.  The patient was advised to contact my clinic with any intolerable side effects.    [place] regulations on seizures and driving were reviewed with the patient. He appeared to clearly understand that he is prohibited from operating a motor vehicle within 3 months following any seizure or other episode with sudden unconsciousness or inability to sit up, and that he is required to report any future such seizure to the [facility] within 30 days after the event.    The patient also reported chronic  paresthesias of both lower extremities that were previously evaluated at [facility].  He has sensory lower extremity changes on his neurological exam that I suspect are chronic.  Due to time constraints I was not able to review previous records or discuss these symptoms in detail but offered the patient to schedule a separate visit to go over them.    DIAGNOSES:    ICD-10-CM    1. First time seizure (H)  R56.9 levETIRAcetam (KEPPRA) 500 MG tablet     Keppra (Levetiracetam) Level   2. Bilateral leg paresthesia  R20.2      PLAN: At today's visit we thoroughly discussed various diagnostic possibilities for patient's symptoms (most likely seizure), evaluation results, available treatment options, and the plan.    We decided to start Keppra.  I advised the patient to take 250 mg every evening for 2 to 3 days, then take 250 mg twice daily for 1 week.  Take 500 mg twice daily thereafter.  We discussed that he needs to check Keppra level in 4 weeks after he is on his target dose, and it could be adjusted based on results.    For his lower extremity symptoms, I advised him to schedule a separate visit at his earliest convenience.    [place] driving laws regarding spells of loss of consciousness or postural control were discussed.  No driving for 90 days after the last event.  Every episode should be reported to [facility] by the  within 30 days.  Other safety precautions were discussed.    Next follow-up appointment is in the next 6 months or earlier if needed.    Total Time: 75 minutes spent on the date of the encounter doing chart review, history and exam, documentation and further activities per the note.    Frank Rahman MD  [facility] Neurology  (Chart documentation was completed in part with Dragon voice-recognition software. Even though reviewed, some grammatical, spelling, and word errors may remain.)              Again, thank you for allowing me to participate in the care of your patient.         Sincerely,        Frank Rahman MD

## 2021-09-28 NOTE — PROGRESS NOTES
INITIAL NEUROLOGY CONSULTATION    DATE OF VISIT: 9/28/2021  CLINIC LOCATION: [facility]  MRN: [id number]  PATIENT NAME: Mason Beatty  YOB: 1945    PRIMARY CARE PROVIDER: Terry Mcmanus MD     REASON FOR VISIT:   Chief Complaint   Patient presents with     Follow Up     after ER (possible seizure)      HISTORY OF PRESENT ILLNESS:                                                    Mr. Mason Beatty is 76 year old right handed male patient with past medical history of lumbar radiculopathy, scoliosis, aortic aneurysm, and transverse myelitis (2001), who was seen in consultation today requested by Terry Mcmanus MD, for suspected first-time seizure.  The patient is accompanied by his wife, who participates in the interview.    Per patient's/his wife's report and review of past medical records, on 8/31/2021 he was found by his wife unresponsive with abnormal breathing/heavy snoring.  She called ambulance and started CPR on the advice of .  Upon paramedics arrival, the patient was agitated and confused according to the notes, and 2 mg of Ativan IM were given, although per wife's perception he was just groggy and unsure of what happened.  He was incontinent of urine and had superficial fady on the left side of the tongue.  Was admitted to [facility] for further evaluation.  In the emergency room he returned back to his mental status baseline.  Head CT was negative.  Lactate was elevated to 4.49.  The patient had no recollection of the prior event.  Head CT was negative.  Portable EEG from 9/1/2021 was normal in awake and drowsy states.  Brain MRI with and without contrast from 8/31/2021 demonstrated chronic microvascular changes without additional abnormal findings, including no structural changes in both temporal lobes.  TTE was normal.  Was seen by inpatient neurologist.  No AED was initiated.    At the present time, the patient denies any seizure  recurrence.  He did not have any prior history of seizures or epilepsy.  His birth and childhood development were unremarkable.  He denies prior history of significant head injuries, strokes, or CNS infections, though mentions that he played soccer at school and bumped his head.  There is no family history of epilepsy or seizures.    No additional useful information is available in Care Everywhere, which was reviewed.    Review of Systems - the patient endorses restlessness, bilateral lower extremity paresthesia, and urinary retention.  All of them were previously discussed with other medical providers.  Otherwise, he denies any other complaints on 14-point comprehensive review of systems.  PAST MEDICAL/SURGICAL HISTORY:                                                    I personally reviewed patient's past medical and surgical history with the patient at today's visit.  MEDICATIONS:                                                    I personally reviewed patient's medications and allergies with the patient at today's visit.  metoprolol succinate ER (TOPROL-XL) 25 MG 24 hr tablet, TAKE 1/2 TABLET BY MOUTH ONCE DAILY  MULTI-VITAMIN OR TABS, None Entered    No current facility-administered medications on file prior to visit.    ALLERGIES:                                                      Allergies   Allergen Reactions     Shellfish Allergy Swelling     FAMILY/SOCIAL HISTORY:                                                    Family and social history was reviewed with the patient at today's visit.  No family history of neurological disorders, except stroke (paternal grandmother).  Former smoker, quit in 1986.  1 beer per day.  Denies use of recreational drugs.  .  Retired  of 30 years.  REVIEW OF SYSTEMS:                                                    Patient has completed a [facility] Patient Health History, including a 14-system review, which was personally reviewed, and pertinent  "positives are listed in HPI. He denies any additional problems on the further questioning.  EXAM:                                                    VITAL SIGNS:   /78 (BP Location: Right leg, Patient Position: Chair)   Pulse 58   Ht 1.727 m (5' 8\")   Wt 79.5 kg (175 lb 3.2 oz)   SpO2 95%   BMI 26.64 kg/m    Mini-Cog Assessment:  Mini Cog Assessment  Clock Draw Score: 2 Normal  3 Item Recall: 3 objects recalled  Mini Cog Total Score: 5  Administered by: : Regi AVENDANO    General: pt is in NAD, cooperative.  Skin: normal turgor, moist mucous membranes, no lesions/rashes noticed.  HEENT: ATNC, EOMI, PERRL, white sclera, normal conjunctiva, no nystagmus or ptosis. No carotid bruits bilaterally.  Respiratory: lung sounds clear to auscultation bilaterally, no crackles, wheezes, rhonchi. Symmetric lung excursion, no accessory respiratory muscle use.  Cardiovascular: normal S1/S2, no murmurs/rubs/gallops.   Abdomen: Not distended.  : deferred.    Neurological:  Mental: alert, follows commands, Mini Cog Total Score: 5/5 with 3/3 on memory recall, no aphasia or dysarthria. Fund of knowledge is appropriate for age.  Cranial Nerves:  CN II: visual acuity - able to accurately count fingers with each eye. Visual fields intact, fundi: discs sharp, no papilledema and normal vessels bilaterally.  CN III, IV, VI: EOM intact, pupils equal and reactive  CN V: facial sensation nl  CN VII: face symmetric, no facial droop  CN VIII: hearing normal  CN IX: palate elevation symmetric, uvula at midline  CN XI SCM normal, shoulder shrug nl  CN XII: tongue midline  Motor: Strength: 5/5 in all major groups of all extremities. Normal tone. No abnormal movements. No pronator drift b/l.  Reflexes: Triceps, biceps, and brachioradialis reflexes are normal and symmetric, patellar and achilles reflexes are on the brisker side, but symmetric. No clonus noted. Toes are equivocal b/l.   Sensory:  light touch is intact, pinprick is reduced in the " right foot, and vibration is reduced in both lower extremity up to his knees.  Proprioception is affected in the right foot.  Romberg: Equivocal.  Coordination: FNF and heel-shin tests intact b/l.   Gait:  Normal casual gait, but has difficulty with tandem.  DATA:   LABS/EEG/IMAGING/OTHER STUDIES: I reviewed pertinent medical records, including Care Everywhere, as detailed in the history of present illness.  ASSESSMENT AND PLAN:      ASSESSMENT: Mason Beatty is a 76 year old male patient with listed above past medical history, who presents with loss of consciousness spell that is suspicious for first-time seizure.    We had a detailed discussion with the patient and his wife regarding his presenting complaints.  The clinical presentation is most likely consistent with seizure rather than convulsive syncope, though it is also included in the differential.  Initial evaluation at [facility] is unrevealing.  I discussed potential additional testing of 24-hour ambulatory EEG and recommendations regarding AED.  We decided to start Keppra after review of typical side effects of increased irritability, mood swings, psychosis, dizziness, depression worsening, and fatigue.  The patient was advised to contact my clinic with any intolerable side effects.    [place] regulations on seizures and driving were reviewed with the patient. He appeared to clearly understand that he is prohibited from operating a motor vehicle within 3 months following any seizure or other episode with sudden unconsciousness or inability to sit up, and that he is required to report any future such seizure to the DM within 30 days after the event.    The patient also reported chronic paresthesias of both lower extremities that were previously evaluated at [facility].  He has sensory lower extremity changes on his neurological exam that I suspect are chronic.  Due to time constraints I was not able to review previous records or discuss  these symptoms in detail but offered the patient to schedule a separate visit to go over them.    DIAGNOSES:    ICD-10-CM    1. First time seizure (H)  R56.9 levETIRAcetam (KEPPRA) 500 MG tablet     Keppra (Levetiracetam) Level   2. Bilateral leg paresthesia  R20.2      PLAN: At today's visit we thoroughly discussed various diagnostic possibilities for patient's symptoms (most likely seizure), evaluation results, available treatment options, and the plan.    We decided to start Keppra.  I advised the patient to take 250 mg every evening for 2 to 3 days, then take 250 mg twice daily for 1 week.  Take 500 mg twice daily thereafter.  We discussed that he needs to check Keppra level in 4 weeks after he is on his target dose, and it could be adjusted based on results.    For his lower extremity symptoms, I advised him to schedule a separate visit at his earliest convenience.    [place] driving laws regarding spells of loss of consciousness or postural control were discussed.  No driving for 90 days after the last event.  Every episode should be reported to DVS by the  within 30 days.  Other safety precautions were discussed.    Next follow-up appointment is in the next 6 months or earlier if needed.    Total Time: 75 minutes spent on the date of the encounter doing chart review, history and exam, documentation and further activities per the note.    Frank Rahman MD  [facility]  (Chart documentation was completed in part with Dragon voice-recognition software. Even though reviewed, some grammatical, spelling, and word errors may remain.)

## 2021-09-28 NOTE — PATIENT INSTRUCTIONS
AFTER VISIT SUMMARY (AVS):    At today's visit we thoroughly discussed various diagnostic possibilities for your symptoms (most likely seizure), evaluation results, available treatment options, and the plan.    We decided to start Keppra.  Please take 250 mg every evening for 2 to 3 days, then take 250 mg twice daily for 1 week.  Take 500 mg twice daily thereafter.  You need to check blood level in 4 weeks after that.    For your lower extremity symptoms, please schedule a separate visit at your earliest convenience.    [place] driving laws regarding spells of loss of consciousness or postural control were discussed.  No driving for 90 days after the last event.  Every episode should be reported to DVS by the  within 30 days.  Other safety precautions were discussed.    Next follow-up appointment is in the next 6 months or earlier if needed.    Please do not hesitate to call me with any questions or concerns.    Thanks.

## 2021-09-29 ENCOUNTER — MYC MEDICAL ADVICE (OUTPATIENT)
Dept: NEUROLOGY | Facility: CLINIC | Age: 76
End: 2021-09-29

## 2021-11-17 ENCOUNTER — LAB (OUTPATIENT)
Dept: LAB | Facility: CLINIC | Age: 76
End: 2021-11-17
Payer: MEDICARE

## 2021-11-17 DIAGNOSIS — R56.9 FIRST TIME SEIZURE (H): ICD-10-CM

## 2021-11-17 PROCEDURE — 99000 SPECIMEN HANDLING OFFICE-LAB: CPT

## 2021-11-17 PROCEDURE — 36415 COLL VENOUS BLD VENIPUNCTURE: CPT

## 2021-11-17 PROCEDURE — 80177 DRUG SCRN QUAN LEVETIRACETAM: CPT | Mod: 90

## 2021-11-18 LAB — LEVETIRACETAM SERPL-MCNC: 20 UG/ML

## 2021-11-22 ENCOUNTER — OFFICE VISIT (OUTPATIENT)
Dept: NEUROLOGY | Facility: CLINIC | Age: 76
End: 2021-11-22
Attending: PSYCHIATRY & NEUROLOGY
Payer: MEDICARE

## 2021-11-22 VITALS — SYSTOLIC BLOOD PRESSURE: 119 MMHG | DIASTOLIC BLOOD PRESSURE: 75 MMHG | HEART RATE: 55 BPM | OXYGEN SATURATION: 96 %

## 2021-11-22 DIAGNOSIS — R20.2 BILATERAL LEG PARESTHESIA: Primary | ICD-10-CM

## 2021-11-22 PROCEDURE — 99417 PROLNG OP E/M EACH 15 MIN: CPT | Performed by: PSYCHIATRY & NEUROLOGY

## 2021-11-22 PROCEDURE — 99215 OFFICE O/P EST HI 40 MIN: CPT | Performed by: PSYCHIATRY & NEUROLOGY

## 2021-11-22 PROCEDURE — G0463 HOSPITAL OUTPT CLINIC VISIT: HCPCS

## 2021-11-22 RX ORDER — ASPIRIN 81 MG/1
81 TABLET ORAL DAILY
COMMUNITY
End: 2021-12-21

## 2021-11-22 NOTE — PATIENT INSTRUCTIONS
AFTER VISIT SUMMARY (AVS):    At today's visit we thoroughly discussed various diagnostic possibilities for your symptoms, necessary evaluation, and the plan, which includes:  Orders Placed This Encounter   Procedures     MR Lumbar Spine w/o Contrast     EMG     Regarding your seizure disorder, Keppra level is therapeutic.  No changes in Keppra dose.     Additional recommendations after the work-up.    Next follow-up appointment is in the next 4 weeks or earlier if needed.    Please do not hesitate to call me with any questions or concerns.    Thanks.

## 2021-11-22 NOTE — LETTER
11/22/2021         RE: Mason Beatty  [address] [place] [address]        Dear Colleague,    Thank you for referring your patient, Mason Beatty, to the [facility] [place]. Please see a copy of my visit note below.    ESTABLISHED PATIENT NEUROLOGY NOTE    DATE OF VISIT: 11/22/2021  CLINIC LOCATION: [facility]  MRN: [id number]  PATIENT NAME: Mason Beatty  YOB: 1945    PCP: Teryr Mcmanus MD    REASON FOR VISIT:   Chief Complaint   Patient presents with     Numbness     bilateral legs from feet to hips      SUBJECTIVE:                                                      HISTORY OF PRESENT ILLNESS: Patient, previously seen once for seizures on 9/28/2021, presents to discuss a new problem, bilateral leg pain and numbness. Please refer to my initial/other prior notes for further information.  He is accompanied by his wife, who participates in the interview.  He brought prior records.    Since the last visit, the patient reports no additional seizures.  We initiated Keppra, and he tolerates it well at 500 mg twice daily.  His recent Keppra level from 11/17/2021 was 20 (therapeutic).    Regarding his bilateral leg pain and numbness, he reports history of transverse myelitis in 1994 and chronic lumbar radiculopathy.  During an episode of transverse myelitis he mainly suffered bilateral lower extremity weakness and difficulty with bladder control.  These symptoms eventually improved, but 4-5 years later he developed sensory symptoms that gradually progressed over time.  He feels that now his sensory symptoms extend from his thighs down to involve his feet, and symptoms in his feet are much more intense and associated with intermittent burning/shooting pain.  He feels that right leg is more affected than the left.  He denies any current urinary symptoms.  He denies excessive alcohol use (2-3 beers per week).    According to report from  [facility], MRI of thoracic spine without contrast from 10/3/2013 demonstrated abnormal signal and volume loss from the upper to mid thoracic spinal cord (left dorsal region) compatible with history of prior myelitis extending from T4-T6 along with multilevel thoracic degeneration and aneurysmal dilatation of ascending aorta.    Lumbar spine MRI with and without contrast from 4/23/2019 demonstrated multilevel degenerative changes with most advanced changes at L4-5 where severe right foraminal stenosis, mild to moderate left-sided foraminal stenosis and mild central canal stenosis were seen with additional moderate to severe bilateral neuroforaminal stenosis at L5-S1 level.    He reports previous evaluation at [facility].  He saw Dr. Perez on 5/21/2019 regarding long history of low back pain and bilateral feet paresthesias.  Based on clinical exam and lumbar spine MRI results, presentation felt consistent with bilateral lumbar radiculopathy with additional differential including peripheral polyneuropathy, and the need for epidural steroid injection was discussed.  EMG from 5/28/2019 that demonstrated evidence of chronic bilateral L5 and right S1 radiculopathies without significant active denervation or evidence of superimposed peripheral neuropathy.  Tabulated data from EMG report were personally reviewed and independently interpreted.    On review of systems, patient endorses no additional active complaints. Medications, allergies, family and social history were also reviewed. There are no changes reported by patient.  REVIEW OF SYSTEMS:                                                    10-system review was completed. Pertinent positives are included in HPI. The remainder of ROS is negative.  EXAM:                                                    Physical Exam:   Vitals: /75 (BP Location: Left arm, Patient Position: Sitting, Cuff Size: Adult Regular)   Pulse 55   SpO2 96%     General:  pt is in NAD, cooperative.  Skin: normal turgor, moist mucous membranes, no lesions/rashes noticed.  HEENT: ATNC, white sclera, normal conjunctiva.  Respiratory: Symmetric lung excursion, no accessory respiratory muscle use.  Abdomen: Non distended.  Neurological: awake, cooperative, follows commands, no aphasia or dysarthria noted, cranial nerves II-XII: no ptosis, face is symmetric, equally moves all extremities, strength is 5/5 bilaterally, patellar reflex is significantly reduced, but produces cross adduction on the right and brisk on the left, achilles reflexes are brisk, but symmetric, pinprick is reduced in the left foot and distal part of the right foot, vibration reduced in both lower extremities up to his knees, proprioception is affected in the right foot, casual gait is normal, but he has slightly bent forward posture.  ASSESSMENT AND PLAN:                                                    Assessment: 76-year-old male patient, previously seen for seizures, presents to discuss new symptoms, chronic bilateral extremity paresthesias that are gradually worsening over time.    The clinical presentation might be consistent with bilateral lumbosacral radiculopathy with possibility of superimposed peripheral polyneuropathy.  Some of his chronic symptoms might be related to previous episode of transverse myelitis.  For further diagnostic clarification, I ordered EMG and repeat lumbar spine MRI to evaluate for interval worsening of previously known advanced multilevel degenerative changes.    The patient also has history of transverse myelitis in 1994.  If his initial evaluation is unrevealing, I might consider pursuing cervical and thoracic spine MRI with and without contrast to evaluate for additional possible causes.    Diagnoses:    ICD-10-CM    1. Bilateral leg paresthesia  R20.2      Plan: At today's visit we thoroughly discussed various diagnostic possibilities for patient's symptoms, necessary evaluation,  and the plan, which includes:  Orders Placed This Encounter   Procedures     MR Lumbar Spine w/o Contrast     EMG     Regarding his seizure disorder, Keppra level is therapeutic.  No changes in Keppra dose.     Additional recommendations after the work-up.    Next follow-up appointment is in the next 4 weeks or earlier if needed.    Total Time: 69 minutes spent on the date of the encounter doing chart review, history and exam, documentation and further activities per the note.    Frank Rahman MD  [facility]  (Chart documentation was completed in part with Dragon voice-recognition software. Even though reviewed, some grammatical, spelling, and word errors may remain.)      Again, thank you for allowing me to participate in the care of your patient.        Sincerely,        Frank Rahman MD

## 2021-11-22 NOTE — PROGRESS NOTES
ESTABLISHED PATIENT NEUROLOGY NOTE    DATE OF VISIT: 11/22/2021  CLINIC LOCATION: [facility]  MRN: [id number]  PATIENT NAME: Mason Beatty  YOB: 1945    PCP: Terry Mcmanus MD    REASON FOR VISIT:   Chief Complaint   Patient presents with     Numbness     bilateral legs from feet to hips      SUBJECTIVE:                                                      HISTORY OF PRESENT ILLNESS: Patient, previously seen once for seizures on 9/28/2021, presents to discuss a new problem, bilateral leg pain and numbness. Please refer to my initial/other prior notes for further information.  He is accompanied by his wife, who participates in the interview.  He brought prior records.    Since the last visit, the patient reports no additional seizures.  We initiated Keppra, and he tolerates it well at 500 mg twice daily.  His recent Keppra level from 11/17/2021 was 20 (therapeutic).    Regarding his bilateral leg pain and numbness, he reports history of transverse myelitis in 1994 and chronic lumbar radiculopathy.  During an episode of transverse myelitis he mainly suffered bilateral lower extremity weakness and difficulty with bladder control.  These symptoms eventually improved, but 4-5 years later he developed sensory symptoms that gradually progressed over time.  He feels that now his sensory symptoms extend from his thighs down to involve his feet, and symptoms in his feet are much more intense and associated with intermittent burning/shooting pain.  He feels that right leg is more affected than the left.  He denies any current urinary symptoms.  He denies excessive alcohol use (2-3 beers per week).    According to report from [facility], MRI of thoracic spine without contrast from 10/3/2013 demonstrated abnormal signal and volume loss from the upper to mid thoracic spinal cord (left dorsal region) compatible with history of prior myelitis extending from T4-T6  along with multilevel thoracic degeneration and aneurysmal dilatation of ascending aorta.    Lumbar spine MRI with and without contrast from 4/23/2019 demonstrated multilevel degenerative changes with most advanced changes at L4-5 where severe right foraminal stenosis, mild to moderate left-sided foraminal stenosis and mild central canal stenosis were seen with additional moderate to severe bilateral neuroforaminal stenosis at L5-S1 level.    He reports previous evaluation at [facility].  He saw Dr. Perez on 5/21/2019 regarding long history of low back pain and bilateral feet paresthesias.  Based on clinical exam and lumbar spine MRI results, presentation felt consistent with bilateral lumbar radiculopathy with additional differential including peripheral polyneuropathy, and the need for epidural steroid injection was discussed.  EMG from 5/28/2019 that demonstrated evidence of chronic bilateral L5 and right S1 radiculopathies without significant active denervation or evidence of superimposed peripheral neuropathy.  Tabulated data from EMG report were personally reviewed and independently interpreted.    On review of systems, patient endorses no additional active complaints. Medications, allergies, family and social history were also reviewed. There are no changes reported by patient.  REVIEW OF SYSTEMS:                                                    10-system review was completed. Pertinent positives are included in HPI. The remainder of ROS is negative.  EXAM:                                                    Physical Exam:   Vitals: /75 (BP Location: Left arm, Patient Position: Sitting, Cuff Size: Adult Regular)   Pulse 55   SpO2 96%     General: pt is in NAD, cooperative.  Skin: normal turgor, moist mucous membranes, no lesions/rashes noticed.  HEENT: ATNC, white sclera, normal conjunctiva.  Respiratory: Symmetric lung excursion, no accessory respiratory muscle use.  Abdomen: Non  distended.  Neurological: awake, cooperative, follows commands, no aphasia or dysarthria noted, cranial nerves II-XII: no ptosis, face is symmetric, equally moves all extremities, strength is 5/5 bilaterally, patellar reflex is significantly reduced, but produces cross adduction on the right and brisk on the left, achilles reflexes are brisk, but symmetric, pinprick is reduced in the left foot and distal part of the right foot, vibration reduced in both lower extremities up to his knees, proprioception is affected in the right foot, casual gait is normal, but he has slightly bent forward posture.  ASSESSMENT AND PLAN:                                                    Assessment: 76-year-old male patient, previously seen for seizures, presents to discuss new symptoms, chronic bilateral extremity paresthesias that are gradually worsening over time.    The clinical presentation might be consistent with bilateral lumbosacral radiculopathy with possibility of superimposed peripheral polyneuropathy.  Some of his chronic symptoms might be related to previous episode of transverse myelitis.  For further diagnostic clarification, I ordered EMG and repeat lumbar spine MRI to evaluate for interval worsening of previously known advanced multilevel degenerative changes.    The patient also has history of transverse myelitis in 1994.  If his initial evaluation is unrevealing, I might consider pursuing cervical and thoracic spine MRI with and without contrast to evaluate for additional possible causes.    Diagnoses:    ICD-10-CM    1. Bilateral leg paresthesia  R20.2      Plan: At today's visit we thoroughly discussed various diagnostic possibilities for patient's symptoms, necessary evaluation, and the plan, which includes:  Orders Placed This Encounter   Procedures     MR Lumbar Spine w/o Contrast     EMG     Regarding his seizure disorder, Keppra level is therapeutic.  No changes in Keppra dose.     Additional recommendations  after the work-up.    Next follow-up appointment is in the next 4 weeks or earlier if needed.    Total Time: 69 minutes spent on the date of the encounter doing chart review, history and exam, documentation and further activities per the note.    Frank Rahman MD  [facility]  (Chart documentation was completed in part with Dragon voice-recognition software. Even though reviewed, some grammatical, spelling, and word errors may remain.)

## 2021-12-06 ENCOUNTER — HOSPITAL ENCOUNTER (OUTPATIENT)
Dept: MRI IMAGING | Facility: CLINIC | Age: 76
Discharge: HOME | End: 2021-12-06
Attending: PSYCHIATRY & NEUROLOGY | Admitting: PSYCHIATRY & NEUROLOGY
Payer: MEDICARE

## 2021-12-06 DIAGNOSIS — R20.2 BILATERAL LEG PARESTHESIA: ICD-10-CM

## 2021-12-06 PROCEDURE — 72148 MRI LUMBAR SPINE W/O DYE: CPT

## 2021-12-19 ENCOUNTER — E-VISIT (OUTPATIENT)
Dept: PEDIATRICS | Facility: CLINIC | Age: 76
End: 2021-12-19
Payer: MEDICARE

## 2021-12-19 DIAGNOSIS — N26.1 RENAL ATROPHY, LEFT: Primary | ICD-10-CM

## 2021-12-19 PROCEDURE — 99421 OL DIG E/M SVC 5-10 MIN: CPT | Performed by: INTERNAL MEDICINE

## 2021-12-20 PROBLEM — N26.1 RENAL ATROPHY, LEFT: Status: ACTIVE | Noted: 2021-12-20

## 2021-12-21 ENCOUNTER — OFFICE VISIT (OUTPATIENT)
Dept: PEDIATRICS | Facility: CLINIC | Age: 76
End: 2021-12-21
Payer: MEDICARE

## 2021-12-21 VITALS
HEART RATE: 55 BPM | OXYGEN SATURATION: 100 % | BODY MASS INDEX: 26.46 KG/M2 | WEIGHT: 174 LBS | TEMPERATURE: 97.1 F | RESPIRATION RATE: 16 BRPM | DIASTOLIC BLOOD PRESSURE: 60 MMHG | SYSTOLIC BLOOD PRESSURE: 108 MMHG

## 2021-12-21 DIAGNOSIS — N26.1 RENAL ATROPHY, LEFT: ICD-10-CM

## 2021-12-21 DIAGNOSIS — G40.909 SEIZURE DISORDER (H): Primary | ICD-10-CM

## 2021-12-21 DIAGNOSIS — R26.9 GAIT ABNORMALITY: ICD-10-CM

## 2021-12-21 DIAGNOSIS — M51.369 DDD (DEGENERATIVE DISC DISEASE), LUMBAR: ICD-10-CM

## 2021-12-21 PROCEDURE — 99214 OFFICE O/P EST MOD 30 MIN: CPT | Performed by: INTERNAL MEDICINE

## 2021-12-21 NOTE — PROGRESS NOTES
Assessment & Plan     (G40.909) Seizure disorder (H)  (primary encounter diagnosis)  Comment:   Plan:    Completing evaluation with neurology, continue Keppra.    (N26.1) Renal atrophy, left  Comment:   Plan: Atrophic left kidney.  Incidental finding.  Preserved renal function.  Recommended adequate hydration and avoiding aspirin and NSAIDs    (M51.36) DDD (degenerative disc disease), lumbar  (R26.9) Gait abnormality  Comment:   Plan: Severe lumbar degenerative disc disease with foraminal stenosis which may account for lower extremity paresthesias and gait abnormality.  Upcoming follow-up with neurology.  Patient states epidural steroids have never been helpful in the past.  consider neurosurgery evaluation if gait abnormality and paresthesias persist or worsen      Return in about 3 months (around 3/21/2022) for follow-up visit.    Terry Mcmanus MD  [facility]  alba is a 76 year old who presents for the following health issues     History of Present Illness       He eats 2-3 servings of fruits and vegetables daily.He consumes 0 sweetened beverage(s) daily.He exercises with enough effort to increase his heart rate 30 to 60 minutes per day.  He exercises with enough effort to increase his heart rate 6 days per week.   He is taking medications regularly.     Recent evaluation for first-time seizure, has met with neurology, now on Keppra without recurrent episodes    Lower extremity paresthesias and gait abnormality.  Also evaluated by neurology and MRI of the lumbar spine obtained demonstrated advanced degenerative changes involving the lumbar spine.  Atrophic left kidney also noted on the scan.  Patient has questions today about this finding and recommendations.    Patient Active Problem List   Diagnosis     Advanced directives, counseling/discussion     Hx of ascending aorta replacement     Bicuspid aortic valve     Tubular adenoma of colon     DDD (degenerative disc  disease), thoracolumbar     Chronic lumbar radiculopathy     Scoliosis, unspecified scoliosis type, unspecified spinal region     Renal atrophy, left     Seizure disorder (H)     Current Outpatient Medications   Medication Sig Dispense Refill     Ascorbic Acid (VITAMIN C) 500 MG CAPS Take by mouth daily       levETIRAcetam (KEPPRA) 500 MG tablet Take 250 mg at bedtime for 2-3 evenings, then 250 mg twice daily for 1 week, take 500 mg twice daily thereafter. 182 tablet 1     metoprolol succinate ER (TOPROL-XL) 25 MG 24 hr tablet TAKE 1/2 TABLET BY MOUTH ONCE DAILY 45 tablet 3     MULTI-VITAMIN OR TABS None Entered              Objective    /60 (Cuff Size: Adult Regular)   Pulse 55   Temp 97.1  F (36.2  C) (Tympanic)   Resp 16   Wt 78.9 kg (174 lb)   SpO2 100%   BMI 26.46 kg/m    Body mass index is 26.46 kg/m .  Physical Exam   GENERAL: healthy, alert and no distress  RESP: lungs clear to auscultation - no rales, rhonchi or wheezes  CV: regular rate and rhythm, normal S1 S2, no S3 or S4, no murmur, click or rub  MS: no gross musculoskeletal defects noted, no edema  PSYCH: mentation appears normal, affect normal/bright  Back: nontender, positive straight leg raise, right

## 2021-12-22 ENCOUNTER — OFFICE VISIT (OUTPATIENT)
Dept: NEUROLOGY | Facility: CLINIC | Age: 76
End: 2021-12-22
Attending: PSYCHIATRY & NEUROLOGY
Payer: MEDICARE

## 2021-12-22 DIAGNOSIS — R20.2 BILATERAL LEG PARESTHESIA: ICD-10-CM

## 2021-12-22 PROCEDURE — 95909 NRV CNDJ TST 5-6 STUDIES: CPT | Performed by: PSYCHIATRY & NEUROLOGY

## 2021-12-22 PROCEDURE — 95885 MUSC TST DONE W/NERV TST LIM: CPT | Mod: 59 | Performed by: PSYCHIATRY & NEUROLOGY

## 2021-12-22 PROCEDURE — 95886 MUSC TEST DONE W/N TEST COMP: CPT | Performed by: PSYCHIATRY & NEUROLOGY

## 2021-12-22 NOTE — PROGRESS NOTES
[facility]  Electrodiagnostic Laboratory                 Department of Neurology                                                                                                         Test Date:  2021    Patient: Mason Beatty : 1945 Physician: Alexis Yun MD   Sex: Male AGE: 76 year Ref Phys: Frank Rahman MD   ID#: [id number]   Technician: Hernando Rodriguez     Clinical Information:  76 year old man with chronic pain and paresthesias in lower limbs. The right side is more affected than the left. Evaluate for polyneuropathy vs radiculopathy.     Techniques:  Motor and sensory conduction studies were done with surface recording electrodes. EMG was done with a concentric needle electrode.     Results:   Nerve conduction studies:   1. Bilateral sural sensory responses are normal.   2. Bilateral peroneal-EDB and tibial-AH motor responses are normal.     Needle EMG of selected proximal and distal right and left lower limb muscles was performed as tabulated below. No abnormal spontaneous activity was observed in the sampled muscles. Motor unit potential morphology and recruitment patterns were normal.     Interpretation:  This is a normal study. There is no electrophysiologic evidence of a lumbosacral radiculopathy or large-fiber polyneuropathy affecting the lower limbs on the basis of this study. Clinical correlation is recommended.     Alexis Yun MD  Department of Neurology    Nerve Conduction Studies  Motor Sites      Latency Amplitude Neg. Amp Diff Segment Distance Velocity Neg. Dur Neg Area Diff Temperature   Site (ms) Norm (mV) Norm %  cm m/s Norm ms %  C   Left Fibular (EDB) Motor   Ankle 4.2  < 6.0 4.7  > 2.0  Ankle-EDB 8   4.9  31.9   Bel Fib Head 11.2 - 4.1 - -12.8 Bel Fib Head-Ankle 31.8 45  > 38 5.5 -3.4 31.9   Pop Fossa 12.7 - 4.0 - -2.4 Pop Fossa-Bel Fib Head 7 47  > 38 5.8 0 31.9   Right Fibular (EDB) Motor   Ankle 3.9  < 6.0 2.1  > 2.0  Ankle-EDB  8   4.6  32.1   Bel Fib Head 10.7 - 1.73 - -17.6 Bel Fib Head-Ankle 30.5 45  > 38 5.5 -5.7 32.2   Pop Fossa 12.3 - 1.75 - 1.16 Pop Fossa-Bel Fib Head 7.5 47  > 38 5.5 2.0 32.2   Left Tibial (AHB) Motor   Ankle 4.8  < 6.5 7.5  > 4.4  Ankle-AHB 8   5.5  31.9   Knee 13.8 - 5.7 - -24.0 Knee-Ankle 37.2 41  > 38 5.8 -17.5 31.7   Right Tibial (AHB) Motor   Ankle 3.4  < 6.5 4.9  > 4.4  Ankle-AHB 8   5.8  32.3   Knee 12.3 - 4.1 - -16.3 Knee-Ankle 35.5 40  > 38 7.2 -12.9 31.8     Sensory Sites      Onset Lat Peak Lat Amp (O-P) Amp (P-P) Segment Distance Velocity Temperature   Site ms ms  V Norm  V  cm m/s Norm  C   Left Sural Sensory   Calf-Lat Mall 3.0 3.8 10  > 5 21 Calf-Lat Mall 14 47  > 38 31.6   Right Sural Sensory   Calf-Lat Mall 2.8 3.5 10  > 5 12 Calf-Lat Mall 14 50  > 38 34.7       Electromyography     Side Muscle Ins Act Fibs/PSW Fasc HF Amp Dur Poly Recrt Int Pat   Right Vastus Lat Nml None Nml 0 Nml Nml 0 Nml Nml   Right Tib Anterior Nml None Nml 0 Nml Nml 0 Nml Nml   Right Gastroc Nml None Nml 0 Nml Nml 0 Nml Nml   Right Fib Longus Nml None Nml 0 Nml Nml 0 Nml Nml   Right Gluteus Med Nml None Nml 0 Nml Nml 0 Nml Nml   Left Vastus Lat Nml None Nml 0 Nml Nml 0 Nml Nml   Left Tib Anterior Nml None Nml 0 Nml Nml 0 Nml Nml   Left Gastroc Nml None Nml 0 Nml Nml 0 Nml Nml   Left Fib Longus Nml None Nml 0 Nml Nml 0 Nml Nml         NCS Waveforms:    Motor                Sensory

## 2021-12-22 NOTE — LETTER
2021       RE: Mason Beatty  [address]  [place] [address]     Dear Colleague,    Thank you for referring your patient, Mason Beatty, to the [facility] EMG CLINIC [place] [facility]. Please see a copy of my visit note below.                        [facility]  Electrodiagnostic Laboratory                 Department of Neurology                                                                                                         Test Date:  2021    Patient: Mason Beatty : 1945 Physician: Alexis Yun MD   Sex: Male AGE: 76 year Ref Phys: Frank Rahman MD   ID#: [id number]   Technician: Hernando Rodriguez     Clinical Information:  76 year old man with chronic pain and paresthesias in lower limbs. The right side is more affected than the left. Evaluate for polyneuropathy vs radiculopathy.     Techniques:  Motor and sensory conduction studies were done with surface recording electrodes. EMG was done with a concentric needle electrode.     Results:   Nerve conduction studies:   1. Bilateral sural sensory responses are normal.   2. Bilateral peroneal-EDB and tibial-AH motor responses are normal.     Needle EMG of selected proximal and distal right and left lower limb muscles was performed as tabulated below. No abnormal spontaneous activity was observed in the sampled muscles. Motor unit potential morphology and recruitment patterns were normal.     Interpretation:  This is a normal study. There is no electrophysiologic evidence of a lumbosacral radiculopathy or large-fiber polyneuropathy affecting the lower limbs on the basis of this study. Clinical correlation is recommended.     Alexis Yun MD  Department of Neurology    Nerve Conduction Studies  Motor Sites      Latency Amplitude Neg. Amp Diff Segment Distance Velocity Neg. Dur Neg Area Diff Temperature   Site (ms) Norm (mV) Norm %  cm m/s Norm ms %   C   Left Fibular (EDB) Motor   Ankle 4.2  < 6.0 4.7  > 2.0  Ankle-EDB 8   4.9  31.9   Bel Fib Head 11.2 - 4.1 - -12.8 Bel Fib Head-Ankle 31.8 45  > 38 5.5 -3.4 31.9   Pop Fossa 12.7 - 4.0 - -2.4 Pop Fossa-Bel Fib Head 7 47  > 38 5.8 0 31.9   Right Fibular (EDB) Motor   Ankle 3.9  < 6.0 2.1  > 2.0  Ankle-EDB 8   4.6  32.1   Bel Fib Head 10.7 - 1.73 - -17.6 Bel Fib Head-Ankle 30.5 45  > 38 5.5 -5.7 32.2   Pop Fossa 12.3 - 1.75 - 1.16 Pop Fossa-Bel Fib Head 7.5 47  > 38 5.5 2.0 32.2   Left Tibial (AHB) Motor   Ankle 4.8  < 6.5 7.5  > 4.4  Ankle-AHB 8   5.5  31.9   Knee 13.8 - 5.7 - -24.0 Knee-Ankle 37.2 41  > 38 5.8 -17.5 31.7   Right Tibial (AHB) Motor   Ankle 3.4  < 6.5 4.9  > 4.4  Ankle-AHB 8   5.8  32.3   Knee 12.3 - 4.1 - -16.3 Knee-Ankle 35.5 40  > 38 7.2 -12.9 31.8     Sensory Sites      Onset Lat Peak Lat Amp (O-P) Amp (P-P) Segment Distance Velocity Temperature   Site ms ms  V Norm  V  cm m/s Norm  C   Left Sural Sensory   Calf-Lat Mall 3.0 3.8 10  > 5 21 Calf-Lat Mall 14 47  > 38 31.6   Right Sural Sensory   Calf-Lat Mall 2.8 3.5 10  > 5 12 Calf-Lat Mall 14 50  > 38 34.7       Electromyography     Side Muscle Ins Act Fibs/PSW Fasc HF Amp Dur Poly Recrt Int Pat   Right Vastus Lat Nml None Nml 0 Nml Nml 0 Nml Nml   Right Tib Anterior Nml None Nml 0 Nml Nml 0 Nml Nml   Right Gastroc Nml None Nml 0 Nml Nml 0 Nml Nml   Right Fib Longus Nml None Nml 0 Nml Nml 0 Nml Nml   Right Gluteus Med Nml None Nml 0 Nml Nml 0 Nml Nml   Left Vastus Lat Nml None Nml 0 Nml Nml 0 Nml Nml   Left Tib Anterior Nml None Nml 0 Nml Nml 0 Nml Nml   Left Gastroc Nml None Nml 0 Nml Nml 0 Nml Nml   Left Fib Longus Nml None Nml 0 Nml Nml 0 Nml Nml         NCS Waveforms:    Motor                Sensory               Alexis Yun MD

## 2022-01-03 ENCOUNTER — OFFICE VISIT (OUTPATIENT)
Dept: NEUROLOGY | Facility: CLINIC | Age: 77
End: 2022-01-03
Attending: PSYCHIATRY & NEUROLOGY
Payer: MEDICARE

## 2022-01-03 VITALS
BODY MASS INDEX: 26.37 KG/M2 | DIASTOLIC BLOOD PRESSURE: 77 MMHG | SYSTOLIC BLOOD PRESSURE: 116 MMHG | HEIGHT: 68 IN | RESPIRATION RATE: 16 BRPM | WEIGHT: 174 LBS | HEART RATE: 60 BPM

## 2022-01-03 DIAGNOSIS — R20.2 BILATERAL LEG PARESTHESIA: Primary | ICD-10-CM

## 2022-01-03 DIAGNOSIS — R56.9 FIRST TIME SEIZURE (H): ICD-10-CM

## 2022-01-03 PROCEDURE — 99214 OFFICE O/P EST MOD 30 MIN: CPT | Performed by: PSYCHIATRY & NEUROLOGY

## 2022-01-03 PROCEDURE — G0463 HOSPITAL OUTPT CLINIC VISIT: HCPCS

## 2022-01-03 ASSESSMENT — MIFFLIN-ST. JEOR: SCORE: 1503.76

## 2022-01-03 NOTE — NURSING NOTE
"Mason Beatty is a 76 year old male who presents for:  Chief Complaint   Patient presents with     Follow Up        Initial Vitals:  /77   Pulse 60   Resp 16   Ht 1.727 m (5' 8\")   Wt 78.9 kg (174 lb)   BMI 26.46 kg/m   Estimated body mass index is 26.46 kg/m  as calculated from the following:    Height as of this encounter: 1.727 m (5' 8\").    Weight as of this encounter: 78.9 kg (174 lb).. Body surface area is 1.95 meters squared. BP completed using cuff size: wrist cuff  Data Unavailable    Nursing Comments: \    Martha Connolly, MARY ELLEN    "

## 2022-01-03 NOTE — PROGRESS NOTES
"ESTABLISHED PATIENT NEUROLOGY NOTE    DATE OF VISIT: 1/3/2022  CLINIC LOCATION: [facility]  MRN: [id number]  PATIENT NAME: Mason Beatty  YOB: 1945    PCP: Terry Mcmanus MD    REASON FOR VISIT:   Chief Complaint   Patient presents with     Follow Up     SUBJECTIVE:                                                      HISTORY OF PRESENT ILLNESS: Patient, with seizure disorder, is here to follow up regarding bilateral leg paresthesias. Please refer to my initial note from 10/22/2021 for further information.    Since the last visit, the patient reports that his symptoms are stable.  Denies interval development of new focal neurological symptoms.    Lumbar spine MRI from 12/6/2021 demonstrated severe multilevel degenerative changes, most pronounced at the L3-4 level where there is moderate to severe right foraminal stenosis, L4-5 where there is severe right foraminal stenosis, and L5-S1 where there is severe bilateral foraminal stenosis.  These findings appear to be stable compared to 2019 and 2015 studies.  Images were personally reviewed and independently interpreted.    EMG from 12/22/2021 was normal without evidence of lumbosacral radiculopathy or large fiber polyneuropathy.  Tabulated data from EMG report were personally reviewed independently interpreted.    On review of systems, patient endorses no other active complaints. Medications, allergies, family and social history were also reviewed. There are no changes reported by patient.  REVIEW OF SYSTEMS:                                                    10-system review was completed. Pertinent positives are included in HPI. The remainder of ROS is negative.  EXAM:                                                    Physical Exam:   Vitals: /77   Pulse 60   Resp 16   Ht 1.727 m (5' 8\")   Wt 78.9 kg (174 lb)   BMI 26.46 kg/m      General: pt is in NAD, cooperative.  Skin: normal turgor, moist mucous " membranes, no lesions/rashes noticed.  HEENT: ATNC, white sclera, normal conjunctiva.  Respiratory: Symmetric lung excursion, no accessory respiratory muscle use.  Abdomen: Non distended.  Neurological: awake, cooperative, follows commands, no exam changes compared to the previous visit.  ASSESSMENT AND PLAN:                                                    Assessment: 76-year-old male patient, previously seen for seizures, presents to follow-up on bilateral lower extremity paresthesias after the completion of recommended work-up, which demonstrated multilevel severe lumbar spine degenerative changes on MRI, stable compared to previous studies from 2019 and 2015.  EMG was negative for presence of lumbosacral radiculopathy or large fiber polyneuropathy.  We reviewed results together, including review of images.  Previously we discussed that we might pursue cervical and thoracic spine MRI's if initial testing is unrevealing.  Today, we discussed this additional evaluation and again and decided to monitor for symptoms worsening.      Regarding his seizure disorder I will see him in March 2022.    Diagnoses:    ICD-10-CM    1. Bilateral leg paresthesia  R20.2    2. First time seizure (H)  R56.9      Plan: At today's visit we thoroughly discussed current symptoms, evaluation results (lumbar spine MRI demonstrated stable multilevel degenerative changes, EMG was normal), possible additional evaluation, and the plan.    We decided to monitor his symptoms for interval worsening.  At that time we might repeat lumbar spine MRI or consider additional levels (cervical or thoracic spine) based on his symptoms.    Next follow-up appointment is on 3/29/2022 or earlier if needed.    Total Time: 31 minutes spent on the date of the encounter doing chart review, history and exam, documentation and further activities per the note.    Frank Rahman MD  [facility]  (Chart documentation was completed in part  with Dragon voice-recognition software. Even though reviewed, some grammatical, spelling, and word errors may remain.)

## 2022-01-03 NOTE — PATIENT INSTRUCTIONS
AFTER VISIT SUMMARY (AVS):    At today's visit we thoroughly discussed current symptoms, evaluation results (lumbar spine MRI demonstrated stable multilevel degenerative changes, EMG was normal), possible additional evaluation, and the plan.    We decided to monitor your symptoms for interval worsening.  At that time we might repeat your lumbar spine MRI or consider additional levels (cervical or thoracic spine) based on your symptoms.    Next follow-up appointment is on 3/29/2022 or earlier if needed.    Please do not hesitate to call me with any questions or concerns.    Thanks.

## 2022-01-03 NOTE — LETTER
1/3/2022         RE: Mason Beatty  [address] [place] [address]        Dear Colleague,    Thank you for referring your patient, Mason Beatty, to the [facility] NEUROLOGY CLINIC [place]. Please see a copy of my visit note below.    ESTABLISHED PATIENT NEUROLOGY NOTE    DATE OF VISIT: 1/3/2022  CLINIC LOCATION: [facility]  MRN: [id number]  PATIENT NAME: Mason Beatty  YOB: 1945    PCP: Terry Mcmanus MD    REASON FOR VISIT:   Chief Complaint   Patient presents with     Follow Up     SUBJECTIVE:                                                      HISTORY OF PRESENT ILLNESS: Patient, with seizure disorder, is here to follow up regarding bilateral leg paresthesias. Please refer to my initial note from 10/22/2021 for further information.    Since the last visit, the patient reports that his symptoms are stable.  Denies interval development of new focal neurological symptoms.    Lumbar spine MRI from 12/6/2021 demonstrated severe multilevel degenerative changes, most pronounced at the L3-4 level where there is moderate to severe right foraminal stenosis, L4-5 where there is severe right foraminal stenosis, and L5-S1 where there is severe bilateral foraminal stenosis.  These findings appear to be stable compared to 2019 and 2015 studies.  Images were personally reviewed and independently interpreted.    EMG from 12/22/2021 was normal without evidence of lumbosacral radiculopathy or large fiber polyneuropathy.  Tabulated data from EMG report were personally reviewed independently interpreted.    On review of systems, patient endorses no other active complaints. Medications, allergies, family and social history were also reviewed. There are no changes reported by patient.  REVIEW OF SYSTEMS:                                                    10-system review was completed. Pertinent positives are included in HPI. The remainder of ROS is negative.  EXAM:    "                                                 Physical Exam:   Vitals: /77   Pulse 60   Resp 16   Ht 1.727 m (5' 8\")   Wt 78.9 kg (174 lb)   BMI 26.46 kg/m      General: pt is in NAD, cooperative.  Skin: normal turgor, moist mucous membranes, no lesions/rashes noticed.  HEENT: ATNC, white sclera, normal conjunctiva.  Respiratory: Symmetric lung excursion, no accessory respiratory muscle use.  Abdomen: Non distended.  Neurological: awake, cooperative, follows commands, no exam changes compared to the previous visit.  ASSESSMENT AND PLAN:                                                    Assessment: 76-year-old male patient, previously seen for seizures, presents to follow-up on bilateral lower extremity paresthesias after the completion of recommended work-up, which demonstrated multilevel severe lumbar spine degenerative changes on MRI, stable compared to previous studies from 2019 and 2015.  EMG was negative for presence of lumbosacral radiculopathy or large fiber polyneuropathy.  We reviewed results together, including review of images.  Previously we discussed that we might pursue cervical and thoracic spine MRI's if initial testing is unrevealing.  Today, we discussed this additional evaluation and again and decided to monitor for symptoms worsening.      Regarding his seizure disorder I will see him in March 2022.    Diagnoses:    ICD-10-CM    1. Bilateral leg paresthesia  R20.2    2. First time seizure (H)  R56.9      Plan: At today's visit we thoroughly discussed current symptoms, evaluation results (lumbar spine MRI demonstrated stable multilevel degenerative changes, EMG was normal), possible additional evaluation, and the plan.    We decided to monitor his symptoms for interval worsening.  At that time we might repeat lumbar spine MRI or consider additional levels (cervical or thoracic spine) based on his symptoms.    Next follow-up appointment is on 3/29/2022 or earlier if needed.    Total " Time: 31 minutes spent on the date of the encounter doing chart review, history and exam, documentation and further activities per the note.    Frank Rahman MD  [facility]  (Chart documentation was completed in part with Dragon voice-recognition software. Even though reviewed, some grammatical, spelling, and word errors may remain.)      Again, thank you for allowing me to participate in the care of your patient.        Sincerely,        Frank Rahman MD

## 2022-02-19 ENCOUNTER — HEALTH MAINTENANCE LETTER (OUTPATIENT)
Age: 77
End: 2022-02-19

## 2022-03-29 ENCOUNTER — OFFICE VISIT (OUTPATIENT)
Dept: NEUROLOGY | Facility: CLINIC | Age: 77
End: 2022-03-29
Payer: MEDICARE

## 2022-03-29 VITALS
SYSTOLIC BLOOD PRESSURE: 105 MMHG | BODY MASS INDEX: 26.37 KG/M2 | DIASTOLIC BLOOD PRESSURE: 62 MMHG | HEIGHT: 68 IN | RESPIRATION RATE: 18 BRPM | HEART RATE: 64 BPM | WEIGHT: 174 LBS | OXYGEN SATURATION: 98 %

## 2022-03-29 DIAGNOSIS — R20.2 BILATERAL LEG PARESTHESIA: Primary | ICD-10-CM

## 2022-03-29 DIAGNOSIS — R56.9 FIRST TIME SEIZURE (H): ICD-10-CM

## 2022-03-29 PROCEDURE — 99214 OFFICE O/P EST MOD 30 MIN: CPT | Performed by: PSYCHIATRY & NEUROLOGY

## 2022-03-29 RX ORDER — LEVETIRACETAM 500 MG/1
500 TABLET ORAL 2 TIMES DAILY
Qty: 182 TABLET | Refills: 3 | Status: SHIPPED | OUTPATIENT
Start: 2022-03-29 | End: 2023-01-24

## 2022-03-29 NOTE — LETTER
"    3/29/2022         RE: Mason Beatty  [address]  [place] [address]        Dear Colleague,    Thank you for referring your patient, Mason Beatty, to the [facility] NEUROLOGY CLINICS [facility]. Please see a copy of my visit note below.    ESTABLISHED PATIENT NEUROLOGY NOTE    DATE OF VISIT: 3/29/2022  CLINIC LOCATION: [facility]  MRN: [id number]  PATIENT NAME: Mason Beatty  YOB: 1945    REASON FOR VISIT:   Chief Complaint   Patient presents with     Follow Up     6 month - seizures      SUBJECTIVE:                                                      HISTORY OF PRESENT ILLNESS: Patient, previously seen for first seizure evaluation and bilateral lower extremity paresthesias is here to follow up.  The last visit was on 1/3/2022, mainly dedicated to his bilateral lower extremity paresthesia (follow-up after work-up).  Please refer to my initial/other prior notes for further information.  The patient is accompanied by his wife, who participates in the interview today.    Since the last visit, the patient reports no additional seizures. He is on 500 mg of Keppra twice daily without noticeable side effects.  His Keppra level was 20 in November 2021.  He denies interval development of new focal neurological symptoms.  EXAM:                                                    Physical Exam:   Vitals: /62   Pulse 64   Resp 18   Ht 1.727 m (5' 8\")   Wt 78.9 kg (174 lb)   SpO2 98%   BMI 26.46 kg/m      General: pt is in NAD, cooperative.  Skin: normal turgor, moist mucous membranes, no lesions/rashes noticed.  HEENT: ATNC, white sclera, normal conjunctiva.  Respiratory: Symmetric lung excursion, no accessory respiratory muscle use.  Abdomen: Non distended.  Neurological: awake, cooperative, follows commands, no exam changes compared to previous visits.  ASSESSMENT AND PLAN:                                                    Assessment: 76 year old male " patient presents for follow-up of first-time seizure.  No additional seizures.  He is on Keppra, 500 mg twice daily, without noticeable side effects.  His Keppra level from November 2021 is therapeutic.  I refilled his prescription.    Bilateral leg paresthesias are stable.  At the last visit we discussed additional work-up that would include cervical and thoracic spine MRI's if symptoms worsen.    Diagnoses:    ICD-10-CM    1. Bilateral leg paresthesia  R20.2    2. First time seizure (H)  R56.9      Plan: At today's visit we thoroughly discussed current symptoms, available treatment options, and the plan.  I am pleased to hear that he remains seizure-free on current therapy, and that his lower extremity symptoms are stable.    We decided not to make any medication changes.  Keppra prescription was refilled for a year.    Next follow-up appointment is in the next 1 year or earlier if needed (additional seizures or symptoms worsening).    Total Time: 21 minutes spent on the date of the encounter doing chart review, history and exam, documentation and further activities per the note.    Frank Rahman MD  [facility]  (Chart documentation was completed in part with Dragon voice-recognition software. Even though reviewed, some grammatical, spelling, and word errors may remain.)        Again, thank you for allowing me to participate in the care of your patient.        Sincerely,        Frank Rahman MD

## 2022-03-29 NOTE — NURSING NOTE
"Mason Beatty is a 76 year old male who presents for:  Chief Complaint   Patient presents with     Follow Up     6 month - seizures         Initial Vitals:  /62   Pulse 64   Resp 18   Ht 1.727 m (5' 8\")   Wt 78.9 kg (174 lb)   SpO2 98%   BMI 26.46 kg/m   Estimated body mass index is 26.46 kg/m  as calculated from the following:    Height as of this encounter: 1.727 m (5' 8\").    Weight as of this encounter: 78.9 kg (174 lb).. Body surface area is 1.95 meters squared. BP completed using cuff size: large  Data Unavailable    Nursing Comments:     Martha Connolly, [facility]    "

## 2022-03-29 NOTE — PATIENT INSTRUCTIONS
AFTER VISIT SUMMARY (AVS):    At today's visit we thoroughly discussed current symptoms, available treatment options, and the plan.  I am pleased to hear that you remain seizure-free on current therapy, and that your lower extremity symptoms are stable.    We decided not to make any medication changes.  Your Keppra prescription was refilled for a year.    Next follow-up appointment is in the next 1 year or earlier if needed (additional seizures or symptoms worsening).    Please do not hesitate to call me with any questions or concerns.    Thanks.

## 2022-03-29 NOTE — PROGRESS NOTES
"ESTABLISHED PATIENT NEUROLOGY NOTE    DATE OF VISIT: 3/29/2022  CLINIC LOCATION: [facility]  MRN: [id number]  PATIENT NAME: Mason Beatty  YOB: 1945    REASON FOR VISIT:   Chief Complaint   Patient presents with     Follow Up     6 month - seizures      SUBJECTIVE:                                                      HISTORY OF PRESENT ILLNESS: Patient, previously seen for first seizure evaluation and bilateral lower extremity paresthesias is here to follow up.  The last visit was on 1/3/2022, mainly dedicated to his bilateral lower extremity paresthesia (follow-up after work-up).  Please refer to my initial/other prior notes for further information.  The patient is accompanied by his wife, who participates in the interview today.    Since the last visit, the patient reports no additional seizures. He is on 500 mg of Keppra twice daily without noticeable side effects.  His Keppra level was 20 in November 2021.  He denies interval development of new focal neurological symptoms.  EXAM:                                                    Physical Exam:   Vitals: /62   Pulse 64   Resp 18   Ht 1.727 m (5' 8\")   Wt 78.9 kg (174 lb)   SpO2 98%   BMI 26.46 kg/m      General: pt is in NAD, cooperative.  Skin: normal turgor, moist mucous membranes, no lesions/rashes noticed.  HEENT: ATNC, white sclera, normal conjunctiva.  Respiratory: Symmetric lung excursion, no accessory respiratory muscle use.  Abdomen: Non distended.  Neurological: awake, cooperative, follows commands, no exam changes compared to previous visits.  ASSESSMENT AND PLAN:                                                    Assessment: 76 year old male patient presents for follow-up of first-time seizure.  No additional seizures.  He is on Keppra, 500 mg twice daily, without noticeable side effects.  His Keppra level from November 2021 is therapeutic.  I refilled his prescription.    Bilateral leg paresthesias " are stable.  At the last visit we discussed additional work-up that would include cervical and thoracic spine MRI's if symptoms worsen.    Diagnoses:    ICD-10-CM    1. Bilateral leg paresthesia  R20.2    2. First time seizure (H)  R56.9      Plan: At today's visit we thoroughly discussed current symptoms, available treatment options, and the plan.  I am pleased to hear that he remains seizure-free on current therapy, and that his lower extremity symptoms are stable.    We decided not to make any medication changes.  Keppra prescription was refilled for a year.    Next follow-up appointment is in the next 1 year or earlier if needed (additional seizures or symptoms worsening).    Total Time: 21 minutes spent on the date of the encounter doing chart review, history and exam, documentation and further activities per the note.    Frank Rahman MD  [facility]  (Chart documentation was completed in part with Dragon voice-recognition software. Even though reviewed, some grammatical, spelling, and word errors may remain.)

## 2022-04-11 ENCOUNTER — MYC MEDICAL ADVICE (OUTPATIENT)
Dept: NEUROLOGY | Facility: CLINIC | Age: 77
End: 2022-04-11
Payer: MEDICARE

## 2022-04-18 ENCOUNTER — OFFICE VISIT (OUTPATIENT)
Dept: PEDIATRICS | Facility: CLINIC | Age: 77
End: 2022-04-18
Payer: MEDICARE

## 2022-04-18 VITALS
RESPIRATION RATE: 16 BRPM | OXYGEN SATURATION: 97 % | TEMPERATURE: 97.8 F | SYSTOLIC BLOOD PRESSURE: 104 MMHG | DIASTOLIC BLOOD PRESSURE: 68 MMHG | BODY MASS INDEX: 27.22 KG/M2 | WEIGHT: 179 LBS | HEART RATE: 77 BPM

## 2022-04-18 DIAGNOSIS — R20.2 BILATERAL LEG PARESTHESIA: ICD-10-CM

## 2022-04-18 DIAGNOSIS — Z12.5 SCREENING FOR PROSTATE CANCER: ICD-10-CM

## 2022-04-18 DIAGNOSIS — N40.1 BENIGN PROSTATIC HYPERPLASIA WITH LOWER URINARY TRACT SYMPTOMS, SYMPTOM DETAILS UNSPECIFIED: ICD-10-CM

## 2022-04-18 DIAGNOSIS — N26.1 RENAL ATROPHY, LEFT: ICD-10-CM

## 2022-04-18 DIAGNOSIS — M48.062 SPINAL STENOSIS, LUMBAR REGION, WITH NEUROGENIC CLAUDICATION: ICD-10-CM

## 2022-04-18 DIAGNOSIS — G40.909 SEIZURE DISORDER (H): Primary | ICD-10-CM

## 2022-04-18 DIAGNOSIS — Z95.828 HX OF ASCENDING AORTA REPLACEMENT: ICD-10-CM

## 2022-04-18 PROCEDURE — 99214 OFFICE O/P EST MOD 30 MIN: CPT | Performed by: INTERNAL MEDICINE

## 2022-04-18 NOTE — PROGRESS NOTES
Assessment & Plan     (G40.909) Seizure disorder (H)  (primary encounter diagnosis)  Comment:   Plan: Stable-continue Keppra/following up with neurology    (R20.2) Bilateral leg paresthesia  (M48.062) Spinal stenosis, lumbar region, with neurogenic claudication  Comment:   Plan: Chronic bilateral lower extremity paresthesias without weakness.  Extensive severe degenerative changes of the lumbar spine-suspect symptoms are secondary to lumbar spinal stenosis.  Neurology has discussed additional imaging if symptoms progress    (Z12.5) Screening for prostate cancer  Comment:   Plan: Did not recommend PSA screening at this age.  Prostate exam today: Smooth symmetric, enlarged consistent with BPH    (N26.1) Renal atrophy, left  Comment:   Plan: Incidental finding on recent MRI, otherwise normal renal function.     (Z95.828) Hx of ascending aorta replacement  Comment:   Plan: History of ascending aortic aneurysm repair in 2013, recent follow-up imaging demonstrated stable graft.    Terry Mcmanus MD  [facility] [place]is is a 76 year old who presents for the following health issues     History of Present Illness       Back Pain:  He presents for follow up of back pain. Patient's back pain is a chronic problem.  Location of back pain:  Right lower back, left lower back, right middle of back and left middle of back  Description of back pain: dull ache  Back pain spreads: right buttocks    Since patient first noticed back pain, pain is: unchanged  Does back pain interfere with his job:  Not applicable      CKD: He is not using over the counter pain medicine. He consumes 2 sweetened beverage(s) daily.He exercises with enough effort to increase his heart rate 20 to 29 minutes per day.  He exercises with enough effort to increase his heart rate 7 days per week.   He is taking medications regularly.     Presents today with several concerns:  Seizure disorder-stable, continues Keppra and ongoing  follow-up with neurology    Bilateral leg paresthesias-prior MRI lumbar spine demonstrated extensive lumbar degenerative changes with foraminal stenosis at several levels.  Denies low back pain, notes that paresthesias extend from buttocks to both feet, denies weakness or gait changes.    Questions about PSA screening and whether this is warranted    History of prior a sending aortic aneurysm and corrective surgery through [facility].  Has questions as to how often he needs imaging    Incidental finding on prior MRI lumbar spine: Atrophic left kidney.  Lab Results   Component Value Date    CR 1.17 11/29/2017    CR 1.00 08/04/2015        Patient Active Problem List   Diagnosis     Advanced directives, counseling/discussion     Hx of ascending aorta replacement     Bicuspid aortic valve     Tubular adenoma of colon     DDD (degenerative disc disease), thoracolumbar     Chronic lumbar radiculopathy     Scoliosis, unspecified scoliosis type, unspecified spinal region     Renal atrophy, left     Seizure disorder (H)     Current Outpatient Medications   Medication Sig Dispense Refill     Ascorbic Acid (VITAMIN C) 500 MG CAPS Take by mouth daily       levETIRAcetam (KEPPRA) 500 MG tablet Take 1 tablet (500 mg) by mouth 2 times daily 182 tablet 3     metoprolol succinate ER (TOPROL-XL) 25 MG 24 hr tablet TAKE 1/2 TABLET BY MOUTH ONCE DAILY 45 tablet 3     MULTI-VITAMIN OR TABS None Entered              Objective    /68 (Cuff Size: Adult Regular)   Pulse 77   Temp 97.8  F (36.6  C) (Tympanic)   Resp 16   Wt 81.2 kg (179 lb)   SpO2 97%   BMI 27.22 kg/m    Body mass index is 27.22 kg/m .  Physical Exam   GENERAL: healthy, alert and no distress  NECK: no adenopathy, no asymmetry, masses  RESP: lungs clear to auscultation - no rales, rhonchi or wheezes  CV: regular rate and rhythm, normal S1 S2, no S3 or S4, no murmur  MS: no gross musculoskeletal defects noted, no edema  SKIN: no suspicious lesions or  rashes  PSYCH: mentation appears normal, affect normal/bright

## 2022-06-02 ENCOUNTER — TRANSFERRED RECORDS (OUTPATIENT)
Dept: HEALTH INFORMATION MANAGEMENT | Facility: CLINIC | Age: 77
End: 2022-06-02
Payer: MEDICARE

## 2022-07-29 DIAGNOSIS — Z95.828 HX OF ASCENDING AORTA REPLACEMENT: ICD-10-CM

## 2022-08-02 RX ORDER — METOPROLOL SUCCINATE 25 MG/1
TABLET, EXTENDED RELEASE ORAL
Qty: 45 TABLET | Refills: 1 | Status: SHIPPED | OUTPATIENT
Start: 2022-08-02 | End: 2023-02-15

## 2022-10-21 ENCOUNTER — MYC MEDICAL ADVICE (OUTPATIENT)
Dept: NEUROLOGY | Facility: CLINIC | Age: 77
End: 2022-10-21

## 2022-10-22 ENCOUNTER — HEALTH MAINTENANCE LETTER (OUTPATIENT)
Age: 77
End: 2022-10-22

## 2022-11-08 ENCOUNTER — IMMUNIZATION (OUTPATIENT)
Dept: PEDIATRICS | Facility: CLINIC | Age: 77
End: 2022-11-08
Payer: MEDICARE

## 2022-11-08 DIAGNOSIS — Z23 HIGH PRIORITY FOR 2019-NCOV VACCINE: ICD-10-CM

## 2022-11-08 DIAGNOSIS — Z23 NEED FOR PROPHYLACTIC VACCINATION AND INOCULATION AGAINST INFLUENZA: ICD-10-CM

## 2022-11-08 PROCEDURE — 91312 COVID-19,PF,PFIZER BOOSTER BIVALENT: CPT

## 2022-11-08 PROCEDURE — 99207 PR NO CHARGE NURSE ONLY: CPT

## 2022-11-08 PROCEDURE — 0124A COVID-19,PF,PFIZER BOOSTER BIVALENT: CPT

## 2022-11-15 ENCOUNTER — OFFICE VISIT (OUTPATIENT)
Dept: PEDIATRICS | Facility: CLINIC | Age: 77
End: 2022-11-15
Payer: MEDICARE

## 2022-11-15 VITALS
WEIGHT: 175.2 LBS | HEART RATE: 63 BPM | DIASTOLIC BLOOD PRESSURE: 64 MMHG | HEIGHT: 67 IN | OXYGEN SATURATION: 97 % | BODY MASS INDEX: 27.5 KG/M2 | RESPIRATION RATE: 16 BRPM | TEMPERATURE: 97.6 F | SYSTOLIC BLOOD PRESSURE: 100 MMHG

## 2022-11-15 DIAGNOSIS — N40.1 BENIGN PROSTATIC HYPERPLASIA WITH LOWER URINARY TRACT SYMPTOMS, SYMPTOM DETAILS UNSPECIFIED: ICD-10-CM

## 2022-11-15 DIAGNOSIS — M21.611 BUNION, RIGHT: Primary | ICD-10-CM

## 2022-11-15 DIAGNOSIS — R35.0 URINARY FREQUENCY: ICD-10-CM

## 2022-11-15 PROCEDURE — 99214 OFFICE O/P EST MOD 30 MIN: CPT | Performed by: INTERNAL MEDICINE

## 2022-11-15 RX ORDER — TAMSULOSIN HYDROCHLORIDE 0.4 MG/1
0.4 CAPSULE ORAL DAILY
Qty: 30 CAPSULE | Refills: 1 | Status: SHIPPED | OUTPATIENT
Start: 2022-11-15 | End: 2023-01-04

## 2022-11-15 ASSESSMENT — PAIN SCALES - GENERAL: PAINLEVEL: MILD PAIN (2)

## 2022-11-15 NOTE — PROGRESS NOTES
Assessment & Plan     (M21.611) Bunion, right  (primary encounter diagnosis)  Comment: Bunion, right foot.  Diagnosis discussed.  Recommended supportive foot wear with a wider toe box to reduce symptoms.  Patient has questions about management options/referral to podiatry  Plan: Orthopedic  Referral          (N40.1) Benign prostatic hyperplasia with lower urinary tract symptoms, symptom details unspecified  Comment: BPH.  Start tamsulosin-side effects reviewed patient will follow-up by MyChart in the next week or 2.  Blood pressure at baseline relatively low-if orthostatic symptoms become a problem discussed switching to finasteride or dutasteride  Plan: tamsulosin (FLOMAX) 0.4 MG capsule          (R35.0) Urinary frequency      Return in about 6 months (around 5/15/2023) for Annual preventative visit.    Terry Mcmanus MD  [facility] [place] is a 77 year old, presenting for the following health issues:  Bladder Problems    Concerns today:  1- prominent callus right foot with history of bunion.  Denies pain but has noted increasing deformity of his right foot and specifically the great toe.  Questions about management  2- follow-up prior physical earlier this year with concerns regarding urinary frequency.  Patient notes worsening symptoms- up to urinate 2-3 times per night and urinates about every 2 hours throughout the day.  Denies dysuria hematuria.    History of Present Illness       Reason for visit:  Bladder  Symptom onset:  More than a month  Symptoms include:  Urinary urgency , urinary leakage  Symptom intensity:  Mild  Symptom progression:  Staying the same    He eats 0-1 servings of fruits and vegetables daily.He consumes 0 sweetened beverage(s) daily.He exercises with enough effort to increase his heart rate 30 to 60 minutes per day.  He exercises with enough effort to increase his heart rate 7 days per week.   He is taking medications regularly.     Patient  "Active Problem List   Diagnosis     Advanced directives, counseling/discussion     Hx of ascending aorta replacement     Bicuspid aortic valve     Tubular adenoma of colon     DDD (degenerative disc disease), thoracolumbar     Chronic lumbar radiculopathy     Scoliosis, unspecified scoliosis type, unspecified spinal region     Renal atrophy, left     Seizure disorder (H)     Benign prostatic hyperplasia with lower urinary tract symptoms, symptom details unspecified     Current Outpatient Medications   Medication Sig Dispense Refill     Ascorbic Acid (VITAMIN C) 500 MG CAPS Take by mouth daily       levETIRAcetam (KEPPRA) 500 MG tablet Take 1 tablet (500 mg) by mouth 2 times daily 182 tablet 3     metoprolol succinate ER (TOPROL XL) 25 MG 24 hr tablet TAKE 1/2 TABLET BY MOUTH EVERY DAY 45 tablet 1     MULTI-VITAMIN OR TABS None Entered       tamsulosin (FLOMAX) 0.4 MG capsule Take 1 capsule (0.4 mg) by mouth daily 30 capsule 1            Review of Systems         Objective    /64 (BP Location: Right arm, Patient Position: Chair, Cuff Size: Adult Regular)   Pulse 63   Temp 97.6  F (36.4  C) (Tympanic)   Resp 16   Ht 1.689 m (5' 6.5\")   Wt 79.5 kg (175 lb 3.2 oz)   SpO2 97%   BMI 27.85 kg/m    Body mass index is 27.85 kg/m .  Physical Exam   GENERAL: healthy, alert and no distress  MS: Moderate to severe bunion right foot with some displacement of the second toe overriding the third  SKIN: Prominent callus plantar aspect of right first MTP  Rectal: Prostate smooth without nodule, enlarged.  PSYCH: mentation appears normal, affect normal/bright                    "

## 2022-12-08 DIAGNOSIS — N40.1 BENIGN PROSTATIC HYPERPLASIA WITH LOWER URINARY TRACT SYMPTOMS, SYMPTOM DETAILS UNSPECIFIED: ICD-10-CM

## 2022-12-12 RX ORDER — TAMSULOSIN HYDROCHLORIDE 0.4 MG/1
CAPSULE ORAL
Qty: 30 CAPSULE | Refills: 1 | OUTPATIENT
Start: 2022-12-12

## 2023-01-24 RX ORDER — LEVETIRACETAM 1000 MG/1
TABLET ORAL
Qty: 180 TABLET | OUTPATIENT
Start: 2023-01-24

## 2023-02-14 DIAGNOSIS — Z95.828 HX OF ASCENDING AORTA REPLACEMENT: ICD-10-CM

## 2023-02-14 NOTE — TELEPHONE ENCOUNTER
Medication Question or Refill        What medication are you calling about (include dose and sig)?: metoprolol succinate ER (TOPROL XL) 25 MG    Preferred Pharmacy:  [id number] - [place], [place] - [address]  [address] [address]  Phone: [phone] Fax: [phone]      Controlled Substance Agreement on file:   CSA -- Patient Level:    CSA: None found at the patient level.       Who prescribed the medication?: Dr Mcmanus    Do you need a refill? Yes    When did you use the medication last? n/a    Patient offered an appointment? No    Do you have any questions or concerns?  No      Could we send this information to you in [employer] or would you prefer to receive a phone call?:   Patient would prefer a phone call   Okay to leave a detailed message?: Yes at Home number on file [phone] (home)

## 2023-02-15 RX ORDER — METOPROLOL SUCCINATE 25 MG/1
TABLET, EXTENDED RELEASE ORAL
Qty: 45 TABLET | Refills: 0 | Status: SHIPPED | OUTPATIENT
Start: 2023-02-15 | End: 2023-04-20

## 2023-03-29 ENCOUNTER — OFFICE VISIT (OUTPATIENT)
Dept: NEUROLOGY | Facility: CLINIC | Age: 78
End: 2023-03-29
Payer: MEDICARE

## 2023-03-29 VITALS
HEIGHT: 67 IN | WEIGHT: 178.13 LBS | OXYGEN SATURATION: 97 % | DIASTOLIC BLOOD PRESSURE: 69 MMHG | BODY MASS INDEX: 27.96 KG/M2 | HEART RATE: 66 BPM | SYSTOLIC BLOOD PRESSURE: 101 MMHG

## 2023-03-29 DIAGNOSIS — R20.2 BILATERAL LEG PARESTHESIA: ICD-10-CM

## 2023-03-29 DIAGNOSIS — R56.9 FIRST TIME SEIZURE (H): Primary | ICD-10-CM

## 2023-03-29 PROCEDURE — 99214 OFFICE O/P EST MOD 30 MIN: CPT | Performed by: PSYCHIATRY & NEUROLOGY

## 2023-03-29 RX ORDER — LEVETIRACETAM 500 MG/1
500 TABLET ORAL 2 TIMES DAILY
Qty: 180 TABLET | Refills: 3 | Status: SHIPPED | OUTPATIENT
Start: 2023-03-29 | End: 2024-02-26

## 2023-03-29 NOTE — PROGRESS NOTES
"ESTABLISHED PATIENT NEUROLOGY NOTE    DATE OF VISIT: 3/29/2023  CLINIC LOCATION: [facility]  MRN: [id number]  PATIENT NAME: Mason Beatty  YOB: 1945    REASON FOR VISIT:   Chief Complaint   Patient presents with     Seizures     Annual Seizure     SUBJECTIVE:                                                      HISTORY OF PRESENT ILLNESS: Patient is here to follow up regarding first-time seizure and bilateral lower extremity paresthesias.  During the last visit on 3/29/2022, no medication changes were made.  Please refer to my initial/other prior notes for further information.    Since the last visit, the patient reports no seizures.  He is on 500 mg of Keppra twice daily without noticeable side effects.  He has bilateral leg paresthesias are stable.  He denies interval development of new focal neurological symptoms.  EXAM:                                                    Physical Exam:   Vitals: /69   Pulse 66   Ht 1.689 m (5' 6.5\")   Wt 80.8 kg (178 lb 2 oz)   SpO2 97%   BMI 28.32 kg/m      General: pt is in NAD, cooperative.  Skin: normal turgor, moist mucous membranes, no lesions/rashes noticed.  HEENT: ATNC, white sclera, normal conjunctiva.  Respiratory: Symmetric lung excursion, no accessory respiratory muscle use.  Abdomen: Non distended.  Neurological: awake, cooperative, follows commands, no aphasia or dysarthria noted, cranial nerves II-XII: no ptosis, face is symmetric, equally moves all extremities, right patellar reflex is significantly reduced and produces crossed abduction, left patellar reflex and Achilles reflexes are brisk, pinprick is reduced in the left foot and distal part of the right foot, vibratory sense is reduced in both extremities, casual gait is normal.  ASSESSMENT AND PLAN:                                                    Assessment: 77 year old male patient presents for follow-up of first-time seizure and bilateral lower extremity " paresthesias.  He remains seizure-free and denies changes in his paresthesias.    Regarding his first-time seizure, he will continue Keppra at 500 mg twice daily.  I refilled his prescription.  I also filled a loss of consciousness DVS form per patient's request.    Regarding his bilateral lower extremity paresthesias in context of prior history of transverse myelitis at T4-6 level, we will plan on doing cervical and thoracic spine MRI's if symptoms worsen in the future.  However, currently symptoms are stable.  We will continue to monitor.    Diagnoses:    ICD-10-CM    1. First time seizure (H)  R56.9       2. Bilateral leg paresthesia  R20.2         Plan: At today's visit we thoroughly discussed current symptoms, available treatment options, and the plan.  I am pleased to hear that the patient remains seizure-free on current therapy.    We decided not to make any medication changes.  Keppra prescription was refilled.    I also signed DVS form regarding driving.    Next follow-up appointment is in the next 1 year or earlier if needed.    Total Time: 21 minutes spent on the date of the encounter doing chart review, history and exam, documentation and further activities per the note.    Frank Rahman MD  [facility] Neurology  (Chart documentation was completed in part with Dragon voice-recognition software. Even though reviewed, some grammatical, spelling, and word errors may remain.)

## 2023-03-29 NOTE — PATIENT INSTRUCTIONS
AFTER VISIT SUMMARY (AVS):    At today's visit we thoroughly discussed current symptoms, available treatment options, and the plan.  I am pleased to hear that you remain seizure-free on current therapy    We decided not to make any medication changes.  Your Keppra prescription was refilled.    I also signed your DVS form regarding driving.    Next follow-up appointment is in the next 1 year or earlier if needed.    Please do not hesitate to call me with any questions or concerns.    Thanks.

## 2023-03-29 NOTE — LETTER
"    3/29/2023         RE: Mason Beatty  [address] [place] [address]        Dear Colleague,    Thank you for referring your patient, Mason Beatty, to the [facility]OLOG[facility]. Please see a copy of my visit note below.    ESTABLISHED PATIENT NEUROLOGY NOTE    DATE OF VISIT: 3/29/2023  CLINIC LOCATION: [facility]  MRN: [id number]  PATIENT NAME: Mason Beatty  YOB: 1945    REASON FOR VISIT:   Chief Complaint   Patient presents with     Seizures     Annual Seizure     SUBJECTIVE:                                                      HISTORY OF PRESENT ILLNESS: Patient is here to follow up regarding first-time seizure and bilateral lower extremity paresthesias.  During the last visit on 3/29/2022, no medication changes were made.  Please refer to my initial/other prior notes for further information.    Since the last visit, the patient reports no seizures.  He is on 500 mg of Keppra twice daily without noticeable side effects.  He has bilateral leg paresthesias are stable.  He denies interval development of new focal neurological symptoms.  EXAM:                                                    Physical Exam:   Vitals: /69   Pulse 66   Ht 1.689 m (5' 6.5\")   Wt 80.8 kg (178 lb 2 oz)   SpO2 97%   BMI 28.32 kg/m      General: pt is in NAD, cooperative.  Skin: normal turgor, moist mucous membranes, no lesions/rashes noticed.  HEENT: ATNC, white sclera, normal conjunctiva.  Respiratory: Symmetric lung excursion, no accessory respiratory muscle use.  Abdomen: Non distended.  Neurological: awake, cooperative, follows commands, no aphasia or dysarthria noted, cranial nerves II-XII: no ptosis, face is symmetric, equally moves all extremities, right patellar reflex is significantly reduced and produces crossed abduction, left patellar reflex and Achilles reflexes are brisk, pinprick is reduced in the left foot and distal part of the right " foot, vibratory sense is reduced in both extremities, casual gait is normal.  ASSESSMENT AND PLAN:                                                    Assessment: 77 year old male patient presents for follow-up of first-time seizure and bilateral lower extremity paresthesias.  He remains seizure-free and denies changes in his paresthesias.    Regarding his first-time seizure, he will continue Keppra at 500 mg twice daily.  I refilled his prescription.  I also filled a loss of consciousness DVS form per patient's request.    Regarding his bilateral lower extremity paresthesias in context of prior history of transverse myelitis at T4-6 level, we will plan on doing cervical and thoracic spine MRI's if symptoms worsen in the future.  However, currently symptoms are stable.  We will continue to monitor.    Diagnoses:    ICD-10-CM    1. First time seizure (H)  R56.9       2. Bilateral leg paresthesia  R20.2         Plan: At today's visit we thoroughly discussed current symptoms, available treatment options, and the plan.  I am pleased to hear that the patient remains seizure-free on current therapy.    We decided not to make any medication changes.  Keppra prescription was refilled.    I also signed DVS form regarding driving.    Next follow-up appointment is in the next 1 year or earlier if needed.    Total Time: 21 minutes spent on the date of the encounter doing chart review, history and exam, documentation and further activities per the note.    Frank Rahman MD  [facility] Neurology  (Chart documentation was completed in part with Dragon voice-recognition software. Even though reviewed, some grammatical, spelling, and word errors may remain.)        Again, thank you for allowing me to participate in the care of your patient.        Sincerely,        Frank Rahman MD

## 2023-03-29 NOTE — NURSING NOTE
"Mason Beatty is a 77 year old male who presents for:  Chief Complaint   Patient presents with     Seizures     Annual Seizure        Initial Vitals:  /69   Pulse 66   Ht 5' 6.5\" (1.689 m)   Wt 178 lb 2 oz (80.8 kg)   SpO2 97%   BMI 28.32 kg/m   Estimated body mass index is 28.32 kg/m  as calculated from the following:    Height as of this encounter: 5' 6.5\" (1.689 m).    Weight as of this encounter: 178 lb 2 oz (80.8 kg).. Body surface area is 1.95 meters squared. BP completed using cuff size: regular        Michelle Rathai  "

## 2023-03-29 NOTE — PROGRESS NOTES
"Mason Beatty is a 77 year old male who presents for:  Chief Complaint   Patient presents with     Seizures     Annual Seizure        Initial Vitals:  /69   Pulse 66   Ht 1.689 m (5' 6.5\")   Wt 80.8 kg (178 lb 2 oz)   SpO2 97%   BMI 28.32 kg/m   Estimated body mass index is 28.32 kg/m  as calculated from the following:    Height as of this encounter: 1.689 m (5' 6.5\").    Weight as of this encounter: 80.8 kg (178 lb 2 oz).. Body surface area is 1.95 meters squared. BP completed using cuff size: small regular    Nursing Comments: Loss of Consciousness form completed and faxed to [place] [employer] (Confr[employer]ed receipt via Right Fax). Scanned to patients file and copy was sent to patient via [employer] as discussed at appointment     Regi Pierre  "

## 2023-04-01 ENCOUNTER — HEALTH MAINTENANCE LETTER (OUTPATIENT)
Age: 78
End: 2023-04-01

## 2023-04-19 DIAGNOSIS — Z95.828 HX OF ASCENDING AORTA REPLACEMENT: ICD-10-CM

## 2023-04-20 RX ORDER — METOPROLOL SUCCINATE 25 MG/1
TABLET, EXTENDED RELEASE ORAL
Qty: 45 TABLET | Refills: 0 | Status: SHIPPED | OUTPATIENT
Start: 2023-04-20 | End: 2023-05-01

## 2023-04-20 NOTE — TELEPHONE ENCOUNTER
Prescription approved per [employer] Refill Protocol.  Zuly Hooker RN, BSN  [facility]

## 2023-05-01 ENCOUNTER — OFFICE VISIT (OUTPATIENT)
Dept: PEDIATRICS | Facility: CLINIC | Age: 78
End: 2023-05-01
Payer: MEDICARE

## 2023-05-01 VITALS
WEIGHT: 178.7 LBS | HEART RATE: 59 BPM | HEIGHT: 67 IN | TEMPERATURE: 96.9 F | BODY MASS INDEX: 28.05 KG/M2 | RESPIRATION RATE: 16 BRPM | OXYGEN SATURATION: 97 % | SYSTOLIC BLOOD PRESSURE: 102 MMHG | DIASTOLIC BLOOD PRESSURE: 60 MMHG

## 2023-05-01 DIAGNOSIS — Z95.828 HX OF ASCENDING AORTA REPLACEMENT: ICD-10-CM

## 2023-05-01 DIAGNOSIS — G40.909 SEIZURE DISORDER (H): ICD-10-CM

## 2023-05-01 DIAGNOSIS — N40.1 BENIGN PROSTATIC HYPERPLASIA WITH LOWER URINARY TRACT SYMPTOMS, SYMPTOM DETAILS UNSPECIFIED: Primary | ICD-10-CM

## 2023-05-01 PROCEDURE — 99214 OFFICE O/P EST MOD 30 MIN: CPT | Performed by: INTERNAL MEDICINE

## 2023-05-01 RX ORDER — METOPROLOL SUCCINATE 25 MG/1
12.5 TABLET, EXTENDED RELEASE ORAL DAILY
Qty: 45 TABLET | Refills: 11 | Status: SHIPPED | OUTPATIENT
Start: 2023-05-01 | End: 2024-05-01

## 2023-05-01 RX ORDER — TAMSULOSIN HYDROCHLORIDE 0.4 MG/1
0.4 CAPSULE ORAL DAILY
Qty: 90 CAPSULE | Refills: 11 | Status: SHIPPED | OUTPATIENT
Start: 2023-05-01 | End: 2024-05-01

## 2023-05-01 ASSESSMENT — PAIN SCALES - GENERAL: PAINLEVEL: NO PAIN (0)

## 2023-05-01 NOTE — PROGRESS NOTES
Assessment & Plan     (N40.1) Benign prostatic hyperplasia with lower urinary tract symptoms, symptom details unspecified  (primary encounter diagnosis)  Comment: Adequate control.  Continue current medication.   Plan: tamsulosin (FLOMAX) 0.4 MG capsule          (Z95.828) Hx of ascending aorta replacement  Comment: stable, continue metoprolol  Plan: metoprolol succinate ER (TOPROL XL) 25 MG 24 hr        tablet          (G40.909) Seizure disorder (H)  Comment:   Plan: well controlled/  Continue current medication/ annual follow-up with neurology    Terry Mcmanus MD  [facility] EKTA willis is a 77 year old, presenting for the following health issues:  Recheck Medication        5/1/2023     2:38 PM   Additional Questions   Roomed by contreras   Accompanied by emiliano         5/1/2023     2:38 PM   Patient Reported Additional Medications   Patient reports taking the following new medications na     History of Present Illness       Reason for visit:  6month follow up    He eats 2-3 servings of fruits and vegetables daily.He consumes 1 sweetened beverage(s) daily. He exercises with enough effort to increase his heart rate 5 days per week.   He is taking medications regularly.     Medication follow-up    bph-sx well controlled  Prior aortic aneurysm repair 10yrs ago at [facility]-stable  Seizure disorder-stable    Patient Active Problem List   Diagnosis     Advanced directives, counseling/discussion     Hx of ascending aorta replacement     Bicuspid aortic valve     Tubular adenoma of colon     DDD (degenerative disc disease), thoracolumbar     Chronic lumbar radiculopathy     Scoliosis, unspecified scoliosis type, unspecified spinal region     Renal atrophy, left     Seizure disorder (H)     Benign prostatic hyperplasia with lower urinary tract symptoms, symptom details unspecified     Past Surgical History:   Procedure Laterality Date     REPAIR ANEURYSM THORACOABDOMINAL      repair 2013, [place]  "        Current Outpatient Medications   Medication Sig Dispense Refill     Ascorbic Acid (VITAMIN C) 500 MG CAPS Take by mouth daily       levETIRAcetam (KEPPRA) 500 MG tablet Take 1 tablet (500 mg) by mouth 2 times daily 180 tablet 3     metoprolol succinate ER (TOPROL XL) 25 MG 24 hr tablet Take 0.5 tablets (12.5 mg) by mouth daily 45 tablet 11     MULTI-VITAMIN OR TABS None Entered       tamsulosin (FLOMAX) 0.4 MG capsule Take 1 capsule (0.4 mg) by mouth daily 90 capsule 11            Objective    /60   Pulse 59   Temp 96.9  F (36.1  C) (Tympanic)   Resp 16   Ht 1.689 m (5' 6.5\")   Wt 81.1 kg (178 lb 11.2 oz)   SpO2 97%   BMI 28.41 kg/m    Body mass index is 28.41 kg/m .  Physical Exam   GENERAL: healthy, alert and no distress  RESP: lungs clear to auscultation - no rales, rhonchi or wheezes  CV: regular rate and rhythm, normal S1 S2, no S3 or S4, no murmur, click or rub, no peripheral edema and peripheral pulses strong  MS: no gross musculoskeletal defects noted, no edema  PSYCH: mentation appears normal, affect normal/bright                    "

## 2023-05-23 ENCOUNTER — TRANSFERRED RECORDS (OUTPATIENT)
Dept: HEALTH INFORMATION MANAGEMENT | Facility: CLINIC | Age: 78
End: 2023-05-23
Payer: MEDICARE

## 2023-06-05 ENCOUNTER — TRANSFERRED RECORDS (OUTPATIENT)
Dept: HEALTH INFORMATION MANAGEMENT | Facility: CLINIC | Age: 78
End: 2023-06-05
Payer: MEDICARE

## 2023-06-08 ENCOUNTER — TRANSFERRED RECORDS (OUTPATIENT)
Dept: HEALTH INFORMATION MANAGEMENT | Facility: CLINIC | Age: 78
End: 2023-06-08

## 2023-06-16 ENCOUNTER — TRANSFERRED RECORDS (OUTPATIENT)
Dept: HEALTH INFORMATION MANAGEMENT | Facility: CLINIC | Age: 78
End: 2023-06-16
Payer: MEDICARE

## 2023-07-25 ENCOUNTER — TRANSFERRED RECORDS (OUTPATIENT)
Dept: HEALTH INFORMATION MANAGEMENT | Facility: CLINIC | Age: 78
End: 2023-07-25
Payer: MEDICARE

## 2023-07-27 ENCOUNTER — TRANSFERRED RECORDS (OUTPATIENT)
Dept: HEALTH INFORMATION MANAGEMENT | Facility: CLINIC | Age: 78
End: 2023-07-27
Payer: MEDICARE

## 2023-10-31 ENCOUNTER — TRANSFERRED RECORDS (OUTPATIENT)
Dept: HEALTH INFORMATION MANAGEMENT | Facility: CLINIC | Age: 78
End: 2023-10-31
Payer: MEDICARE

## 2023-11-21 ENCOUNTER — MYC MEDICAL ADVICE (OUTPATIENT)
Dept: NEUROLOGY | Facility: CLINIC | Age: 78
End: 2023-11-21
Payer: MEDICARE

## 2023-11-21 ENCOUNTER — TRANSFERRED RECORDS (OUTPATIENT)
Dept: HEALTH INFORMATION MANAGEMENT | Facility: CLINIC | Age: 78
End: 2023-11-21
Payer: MEDICARE

## 2023-11-22 NOTE — TELEPHONE ENCOUNTER
Last OV 3/29/23.    Per chart review pt did SCS trial with [facility], but notes do not mention pregabalin.      No interaction noted in micromedex between Lyrica and Levetiracetam.     Marlee AVILES RN, BSN  [facility]rology

## 2023-11-27 DIAGNOSIS — Z95.828 HX OF ASCENDING AORTA REPLACEMENT: ICD-10-CM

## 2023-11-28 RX ORDER — METOPROLOL SUCCINATE 25 MG/1
12.5 TABLET, EXTENDED RELEASE ORAL DAILY
Qty: 45 TABLET | Refills: 11 | OUTPATIENT
Start: 2023-11-28

## 2023-12-11 ENCOUNTER — DOCUMENTATION ONLY (OUTPATIENT)
Dept: NEUROLOGY | Facility: CLINIC | Age: 78
End: 2023-12-11
Payer: MEDICARE

## 2023-12-11 NOTE — PROGRESS NOTES
Per RN reaching out to see if I can get clinic records from [facility] after they were advised patient has seen provider there.    I saw Dr. Helio Lora at the [facility] in [place] ([phone]) yesterday regarding the pain in my legs.  He recommended starting on Lyrica 75mg once daily for a week to see if this would help and then increasing the dosage in a week or so.    Mason Beatty     Left message with patients name and  asking for records     Since no response was received faxed over request for records     Faxed  Request for medical records to [facility] in [place]  2023 to fax number [phone]    Right Fax confirmed at 11:16:14  AM    Regi Pierre

## 2024-02-26 DIAGNOSIS — R56.9 FIRST TIME SEIZURE (H): ICD-10-CM

## 2024-02-26 RX ORDER — LEVETIRACETAM 500 MG/1
500 TABLET ORAL 2 TIMES DAILY
Qty: 180 TABLET | Refills: 11 | Status: SHIPPED | OUTPATIENT
Start: 2024-02-26 | End: 2024-03-26

## 2024-03-15 ENCOUNTER — TRANSFERRED RECORDS (OUTPATIENT)
Dept: HEALTH INFORMATION MANAGEMENT | Facility: CLINIC | Age: 79
End: 2024-03-15
Payer: MEDICARE

## 2024-03-25 ENCOUNTER — TELEPHONE (OUTPATIENT)
Dept: NEUROLOGY | Facility: CLINIC | Age: 79
End: 2024-03-25
Payer: MEDICARE

## 2024-03-25 NOTE — TELEPHONE ENCOUNTER
Reminder message was left for patient's neurology visit on 3-26-25 with Dr. Rahman.    Patient advised to arrive at 12:45p for a 1p appointment with the [facility].    Patient advised to call [phone] if there are questions.

## 2024-03-26 ENCOUNTER — VIRTUAL VISIT (OUTPATIENT)
Dept: NEUROLOGY | Facility: CLINIC | Age: 79
End: 2024-03-26
Payer: MEDICARE

## 2024-03-26 ENCOUNTER — TELEPHONE (OUTPATIENT)
Dept: NEUROLOGY | Facility: CLINIC | Age: 79
End: 2024-03-26

## 2024-03-26 ENCOUNTER — DOCUMENTATION ONLY (OUTPATIENT)
Dept: NEUROLOGY | Facility: CLINIC | Age: 79
End: 2024-03-26

## 2024-03-26 DIAGNOSIS — R20.2 BILATERAL LEG PARESTHESIA: ICD-10-CM

## 2024-03-26 DIAGNOSIS — R56.9 FIRST TIME SEIZURE (H): Primary | ICD-10-CM

## 2024-03-26 PROCEDURE — 99214 OFFICE O/P EST MOD 30 MIN: CPT | Mod: 95 | Performed by: PSYCHIATRY & NEUROLOGY

## 2024-03-26 PROCEDURE — G2211 COMPLEX E/M VISIT ADD ON: HCPCS | Mod: 95 | Performed by: PSYCHIATRY & NEUROLOGY

## 2024-03-26 RX ORDER — PREGABALIN 100 MG/1
100 CAPSULE ORAL 2 TIMES DAILY
COMMUNITY
Start: 2023-12-15 | End: 2024-05-01

## 2024-03-26 RX ORDER — LEVETIRACETAM 500 MG/1
500 TABLET ORAL 2 TIMES DAILY
Qty: 182 TABLET | Refills: 3 | Status: SHIPPED | OUTPATIENT
Start: 2024-03-26

## 2024-03-26 NOTE — PROGRESS NOTES
ESTABLISHED PATIENT NEUROLOGY NOTE    DATE OF VISIT: 3/26/2024  CLINIC LOCATION: [facility]  MRN: [id number]  PATIENT NAME: Mason Beatty  YOB: 1945    REASON FOR VISIT: No chief complaint on file.    SUBJECTIVE:                                                      HISTORY OF PRESENT ILLNESS: Patient is here to follow up regarding first-time seizure and bilateral lower extremity paresthesia.  The last visit was on 3/29/2023.  No medication changes were made.  Please refer to my initial/other prior notes for further information.    Since the last visit, the patient reports no seizures.  He is on 500 mg of Keppra twice daily without noticeable side effects.  Bilateral leg paresthesias are stable.  He was evaluated in November [facility] by Dr. Lora, who felt that his symptoms would rather represent peripheral polyneuropathy over bilateral lumbosacral radiculopathy and started him on Lyrica 75 mg twice daily, which is somewhat helpful in patient's opinion.  He denies any significant degree of burning pain even before starting Lyrica.  EXAM:                                                    Physical Exam:   Vitals: There were no vitals taken for this visit.    General: pt is in NAD, cooperative.  Skin: normal turgor, moist mucous membranes, no lesions/rashes noticed.  HEENT: ATNC, white sclera, normal conjunctiva.  Respiratory: Symmetric lung excursion, no accessory respiratory muscle use.  Abdomen: Non distended.  Neurological: awake, cooperative, follows commands, no aphasia or dysarthria noted, cranial nerves II-XII: no ptosis, extraocular motility is full, face is symmetric, tongue is midline, equally moves all extremities, normal tone in upper and lower extremities, sensation to the light touch is intact bilaterally, pronator drift is negative, finger to nose intact bilaterally, casual gait is normal.  ASSESSMENT AND PLAN:                                                     Assessment: 78 year old male patient presents for follow-up of seizure disorder and bilateral lower extremity paresthesia.    Regarding his seizure disorder, he remains seizure-free on current therapy.  I would suggest to continue it without changes.  I refilled his Keppra prescription for another year.    Regarding his bilateral lower extremity paresthesias, previous EMG was negative for lumbosacral radiculopathy or large fiber polyneuropathy.  He denies any significant degree of pain to suspect small fiber peripheral polyneuropathy.  For that reason, I am unsure on how Dr. Lora arrived at his diagnosis of peripheral polyneuropathy, but certainly skin biopsy could be considered in the future if he develops neuropathic pain.  As long as Lyrica is helpful, it should be continued.    Diagnoses:    ICD-10-CM    1. First time seizure (H)  R56.9       2. Bilateral leg paresthesia  R20.2         Plan:  There are no Patient Instructions on file for this visit.    Total Time: *** minutes spent on the date of the encounter doing chart review, history and exam, documentation and further activities per the note.    Frank Rahman MD  [facility]rology  (Chart documentation was completed in part with Dragon voice-recognition software. Even though reviewed, some grammatical, spelling, and word errors may remain.)

## 2024-03-26 NOTE — LETTER
"    3/26/2024         RE: Mason Beatty  [address] [place] [address]        Dear Colleague,    Thank you for referring your patient, Mason Beatty, to the [facility]UROLOGY [facility]. Please see a copy of my visit note below.    ESTABLISHED NEUROLOGY TELEMEDICINE VISIT NOTE.    DATE OF VISIT: 3/26/2024  MRN: [id number]  PATIENT NAME: Mason Beatty  YOB: 1945    Mason Beatty is a 78 year old male who is being evaluated via a billable video visit.      The patient has been notified of following:     \"This video visit will be conducted via a call between you and your physician/provider. We have found that certain health care needs can be provided without the need for an in-person physical exam.  This service lets us provide the care you need with a video conversation.  If a prescription is necessary we can send it directly to your pharmacy.  If lab work is needed we can place an order for that and you can then stop by our lab to have the test done at a later time.    Video visits are billed at different rates depending on your insurance coverage.  Please reach out to your insurance provider with any questions.    If during the course of the call the physician/provider feels a video visit is not appropriate, you will not be charged for this service.\"    Patient has given verbal consent for Video visit? Yes    Will anyone else be joining your video visit? No  {If patient encounters technical issues they should call [phone].    I have reviewed and updated the patient's Past Medical History, Social History, Family History and Medication List.    Regi Pierre Clinic Assistant       Additional provider notes:    This is a telemedicine visit that was initiated by the patient and performed with the originating site at patient's home and the distant location, as specified below.  Verbal consent to participate in video visit was obtained.  I discussed with the " patient the nature of our telemedicine visits, that:  - I would evaluate the patient and recommend diagnostics and treatments based on my assessment  - Our sessions are not being recorded and that personal health information is protected  - Our team would provide follow-up care in person if/when the patient needs it.    REASON FOR VISIT:   Chief Complaint   Patient presents with     Follow Up     Annual Seizure      HISTORY OF PRESENT ILLNESS:                                                    Mr. Mason Beatty is 78 year old male patient, who was evaluated today by telemedicine visit in follow-up for first-time seizure and bilateral lower extremity paresthesia.  The last visit was on 3/29/2023.  No medication changes were made.  Please refer to my initial/other prior notes for further information.    Since the last visit, the patient reports no seizures.  He is on 500 mg of Keppra twice daily without noticeable side effects.  Bilateral leg paresthesias are stable.  He was evaluated in November 2023 at [facility] clinic by Dr. Lora, who felt that his symptoms would rather represent peripheral polyneuropathy over bilateral lumbosacral radiculopathy and started him on Lyrica 75 mg twice daily, which is somewhat helpful in patient's opinion.  He denies any significant degree of burning pain even before starting Lyrica.  EXAM:                                                    General: pt is in NAD, cooperative.  Skin: no lesions/rashes noticed.  HEENT: ATNC.  Neurological:  awake, cooperative, follows commands, no aphasia or dysarthria noted, cranial nerves II-XII: no ptosis, extraocular motility is full, face is symmetric, tongue is midline, equally moves all extremities.  ASSESSMENT AND PLAN:      ASSESSMENT: Mason Beatty is a 78 year old male patient, who is evaluated via a telemedicine follow-up visit for seizure disorder and bilateral lower extremity paresthesia.    Regarding his seizure disorder, he  remains seizure-free on current therapy.  I would suggest to continue it without changes.  I refilled his Keppra prescription for another year.    Regarding his bilateral lower extremity paresthesias, previous EMG was negative for lumbosacral radiculopathy or large fiber polyneuropathy.  He denies any significant degree of pain to suspect small fiber peripheral polyneuropathy.  For that reason, I am unsure on how Dr. Lora arrived at his diagnosis of peripheral polyneuropathy, but certainly skin biopsy could be considered in the future if he develops neuropathic pain.  As long as Lyrica is helpful, it should be continued.      DIAGNOSES:    ICD-10-CM    1. First time seizure (H)  R56.9       2. Bilateral leg paresthesia  R20.2         PLAN: At today's visit we thoroughly discussed current symptoms, available treatment options, and the plan.  I am pleased to hear that he remains seizure-free on current therapy.    We decided not to make any medication changes.  Keppra prescription was refilled.    He also requested [facility] paperwork to be filled out, and it will be done once we receive the signed by the patient form.    Next follow-up appointment is in the next 1 year or earlier if needed.    Video-Visit Details    Type of service:  Video Visit    Video Start Time: 9:39 AM.    Video End Time (time video stopped): 9:43 AM.    Originating Location (pt. Location): Home    Distant Location (provider location):  [facility]UROLOG[facility]INICS [facility]     Mode of Communication:  Video Conference via [employer]    Total Time: 21 minutes.  4 minutes were spent in video call and the rest for chart review in preparation for this visit and documentation.    Frank Rahman MD.    [facility] Neurology    (Chart documentation was completed in part with Dragon voice-recognition software. Even though reviewed, some grammatical, spelling, and word errors may remain.)      Again, thank you for allowing me to  participate in the care of your patient.        Sincerely,        Frank Rahman MD

## 2024-03-26 NOTE — PROGRESS NOTES
"ESTABLISHED NEUROLOGY TELEMEDICINE VISIT NOTE.    DATE OF VISIT: 3/26/2024  MRN: [id number]  PATIENT NAME: Mason Baetty  YOB: 1945    Mason Beatty is a 78 year old male who is being evaluated via a billable video visit.      The patient has been notified of following:     \"This video visit will be conducted via a call between you and your physician/provider. We have found that certain health care needs can be provided without the need for an in-person physical exam.  This service lets us provide the care you need with a video conversation.  If a prescription is necessary we can send it directly to your pharmacy.  If lab work is needed we can place an order for that and you can then stop by our lab to have the test done at a later time.    Video visits are billed at different rates depending on your insurance coverage.  Please reach out to your insurance provider with any questions.    If during the course of the call the physician/provider feels a video visit is not appropriate, you will not be charged for this service.\"    Patient has given verbal consent for Video visit? Yes    Will anyone else be joining your video visit? No  {If patient encounters technical issues they should call [phone].    I have reviewed and updated the patient's Past Medical History, Social History, Family History and Medication List.    Regi Pierre Clinic Assistant       Additional provider notes:    This is a telemedicine visit that was initiated by the patient and performed with the originating site at patient's home and the distant location, as specified below.  Verbal consent to participate in video visit was obtained.  I discussed with the patient the nature of our telemedicine visits, that:  - I would evaluate the patient and recommend diagnostics and treatments based on my assessment  - Our sessions are not being recorded and that personal health information is protected  - Our team would provide " follow-up care in person if/when the patient needs it.    REASON FOR VISIT:   Chief Complaint   Patient presents with    Follow Up     Annual Seizure      HISTORY OF PRESENT ILLNESS:                                                    Mr. Mason Beatty is 78 year old male patient, who was evaluated today by telemedicine visit in follow-up for first-time seizure and bilateral lower extremity paresthesia.  The last visit was on 3/29/2023.  No medication changes were made.  Please refer to my initial/other prior notes for further information.    Since the last visit, the patient reports no seizures.  He is on 500 mg of Keppra twice daily without noticeable side effects.  Bilateral leg paresthesias are stable.  He was evaluated in November 2023 at [facility] clinic by Dr. Lora, who felt that his symptoms would rather represent peripheral polyneuropathy over bilateral lumbosacral radiculopathy and started him on Lyrica 75 mg twice daily, which is somewhat helpful in patient's opinion.  He denies any significant degree of burning pain even before starting Lyrica.  EXAM:                                                    General: pt is in NAD, cooperative.  Skin: no lesions/rashes noticed.  HEENT: ATNC.  Neurological:  awake, cooperative, follows commands, no aphasia or dysarthria noted, cranial nerves II-XII: no ptosis, extraocular motility is full, face is symmetric, tongue is midline, equally moves all extremities.  ASSESSMENT AND PLAN:      ASSESSMENT: Mason Beatty is a 78 year old male patient, who is evaluated via a telemedicine follow-up visit for seizure disorder and bilateral lower extremity paresthesia.    Regarding his seizure disorder, he remains seizure-free on current therapy.  I would suggest to continue it without changes.  I refilled his Keppra prescription for another year.    Regarding his bilateral lower extremity paresthesias, previous EMG was negative for lumbosacral radiculopathy or large  fiber polyneuropathy.  He denies any significant degree of pain to suspect small fiber peripheral polyneuropathy.  For that reason, I am unsure on how Dr. Lora arrived at his diagnosis of peripheral polyneuropathy, but certainly skin biopsy could be considered in the future if he develops neuropathic pain.  As long as Lyrica is helpful, it should be continued.      DIAGNOSES:    ICD-10-CM    1. First time seizure (H)  R56.9       2. Bilateral leg paresthesia  R20.2         PLAN: At today's visit we thoroughly discussed current symptoms, available treatment options, and the plan.  I am pleased to hear that he remains seizure-free on current therapy.    We decided not to make any medication changes.  Keppra prescription was refilled.    He also requested [facility] paperwork to be filled out, and it will be done once we receive the signed by the patient form.    Next follow-up appointment is in the next 1 year or earlier if needed.    Video-Visit Details    Type of service:  Video Visit    Video Start Time: 9:39 AM.    Video End Time (time video stopped): 9:43 AM.    Originating Location (pt. Location): Home    Distant Location (provider location):  [facility] NEUROLOGY CLINICS [facility]     Mode of Communication:  Video Conference via [employer]    Total Time: 21 minutes.  4 minutes were spent in video call and the rest for chart review in preparation for this visit and documentation.    Frank Rahman MD.    [facility] Neurology    (Chart documentation was completed in part with Dragon voice-recognition software. Even though reviewed, some grammatical, spelling, and word errors may remain.)

## 2024-03-26 NOTE — PATIENT INSTRUCTIONS
AFTER VISIT SUMMARY (AVS):    At today's visit we thoroughly discussed current symptoms, available treatment options, and the plan.  I am pleased to hear that you remain seizure-free on current therapy.    We decided not to make any medication changes.  Keppra prescription was refilled.    Next follow-up appointment is in the next 1 year or earlier if needed.    Please do not hesitate to call me with any questions or concerns.    Thanks.

## 2024-03-26 NOTE — TELEPHONE ENCOUNTER
Received teams message from  patient called requesting virtual. Called per Dr Telles and confirmed patient has not had seizures this year. Changed to virtual and moved to 9:30 am as well. Discussed how to get annual Loss Of Consciousness form completed sent copy to patient and they will get back to us. Once fully completed will fax to [employer] and send original to patient for their records.     After visit Dr Rahman requested I call back to assist with scheduling 1 year follow up

## 2024-03-26 NOTE — PROGRESS NOTES
Faxed  Loss of Consciousness From to  [employer]   March 26, 2024 to fax number [phone]    Right Fax confirmed at 12:15:47 PM    Regi Pierre

## 2024-05-01 ENCOUNTER — OFFICE VISIT (OUTPATIENT)
Dept: PEDIATRICS | Facility: CLINIC | Age: 79
End: 2024-05-01
Payer: MEDICARE

## 2024-05-01 VITALS
SYSTOLIC BLOOD PRESSURE: 104 MMHG | BODY MASS INDEX: 28.4 KG/M2 | RESPIRATION RATE: 16 BRPM | TEMPERATURE: 97.1 F | OXYGEN SATURATION: 98 % | DIASTOLIC BLOOD PRESSURE: 60 MMHG | WEIGHT: 176.7 LBS | HEIGHT: 66 IN | HEART RATE: 54 BPM

## 2024-05-01 DIAGNOSIS — G40.909 SEIZURE DISORDER (H): Primary | ICD-10-CM

## 2024-05-01 DIAGNOSIS — N40.1 BENIGN PROSTATIC HYPERPLASIA WITH LOWER URINARY TRACT SYMPTOMS, SYMPTOM DETAILS UNSPECIFIED: ICD-10-CM

## 2024-05-01 DIAGNOSIS — Z95.828 HX OF ASCENDING AORTA REPLACEMENT: ICD-10-CM

## 2024-05-01 DIAGNOSIS — M54.16 CHRONIC LUMBAR RADICULOPATHY: ICD-10-CM

## 2024-05-01 PROCEDURE — 99213 OFFICE O/P EST LOW 20 MIN: CPT | Performed by: INTERNAL MEDICINE

## 2024-05-01 RX ORDER — METOPROLOL SUCCINATE 25 MG/1
12.5 TABLET, EXTENDED RELEASE ORAL DAILY
Qty: 45 TABLET | Refills: 11 | Status: SHIPPED | OUTPATIENT
Start: 2024-05-01

## 2024-05-01 RX ORDER — PREGABALIN 100 MG/1
100 CAPSULE ORAL 2 TIMES DAILY
Qty: 180 CAPSULE | Refills: 3 | Status: SHIPPED | OUTPATIENT
Start: 2024-05-01

## 2024-05-01 RX ORDER — RESPIRATORY SYNCYTIAL VIRUS VACCINE 120MCG/0.5
0.5 KIT INTRAMUSCULAR ONCE
Qty: 1 EACH | Refills: 0 | Status: CANCELLED | OUTPATIENT
Start: 2024-05-01 | End: 2024-05-01

## 2024-05-01 ASSESSMENT — PAIN SCALES - GENERAL: PAINLEVEL: NO PAIN (0)

## 2024-05-01 NOTE — PROGRESS NOTES
"  Assessment & Plan     (G40.909) Seizure disorder (H)  (primary encounter diagnosis)  Comment:   Plan: stable, continue keppra    (Z95.828) Hx of ascending aorta replacement  Comment:     Plan: metoprolol succinate ER (TOPROL XL) 25 MG 24 hr        tablet          (M54.16) Chronic lumbar radiculopathy  Comment: daily stretching, walks regularly.  Symptoms well controlled  Plan: pregabalin (LYRICA) 100 MG capsule          (N40.1) Benign prostatic hyperplasia with lower urinary tract symptoms, symptom details unspecified  Comment:   Plan: mild sx, stopped flomax      BMI  Estimated body mass index is 28.23 kg/m  as calculated from the following:    Height as of this encounter: 1.685 m (5' 6.34\").    Weight as of this encounter: 80.2 kg (176 lb 11.2 oz).             Yulissa Sena is a 78 year old, presenting for the following health issues:  Back Pain        5/1/2024     7:12 AM   Additional Questions   Roomed by Kaela Johnson   Accompanied by NAZ     History of Present Illness       Back Pain:  He presents for follow up of back pain. Patient's back pain is a chronic problem.  Location of back pain:  Right middle of back  Description of back pain: dull ache  Back pain spreads: left buttocks, left thigh, left knee and left foot    Since patient first noticed back pain, pain is: unchanged  Does back pain interfere with his job:  Not applicable       He eats 2-3 servings of fruits and vegetables daily.He consumes 0 sweetened beverage(s) daily.He exercises with enough effort to increase his heart rate 10 to 19 minutes per day.  He exercises with enough effort to increase his heart rate 3 or less days per week.   He is taking medications regularly.       Patient Active Problem List   Diagnosis    Advanced directives, counseling/discussion    Hx of ascending aorta replacement    Bicuspid aortic valve    Tubular adenoma of colon    DDD (degenerative disc disease), thoracolumbar    Chronic lumbar radiculopathy    Scoliosis, " "unspecified scoliosis type, unspecified spinal region    Renal atrophy, left    Seizure disorder (H)    Benign prostatic hyperplasia with lower urinary tract symptoms, symptom details unspecified     Current Outpatient Medications   Medication Sig Dispense Refill    Ascorbic Acid (VITAMIN C) 500 MG CAPS Take by mouth daily      levETIRAcetam (KEPPRA) 500 MG tablet Take 1 tablet (500 mg) by mouth 2 times daily 182 tablet 3    metoprolol succinate ER (TOPROL XL) 25 MG 24 hr tablet Take 0.5 tablets (12.5 mg) by mouth daily 45 tablet 11    MULTI-VITAMIN OR TABS None Entered      pregabalin (LYRICA) 100 MG capsule Take 1 capsule (100 mg) by mouth 2 times daily 180 capsule 3              Objective    /60 (BP Location: Right arm, Patient Position: Sitting, Cuff Size: Adult Regular)   Pulse 54   Temp 97.1  F (36.2  C) (Tympanic)   Resp 16   Ht 1.685 m (5' 6.34\")   Wt 80.2 kg (176 lb 11.2 oz)   SpO2 98%   BMI 28.23 kg/m    Body mass index is 28.23 kg/m .  Physical Exam   GENERAL: alert and no distress  PSYCH: mentation appears normal, affect normal/bright            Signed Electronically by: Terry Mcmanus MD  "

## 2024-06-02 ENCOUNTER — HEALTH MAINTENANCE LETTER (OUTPATIENT)
Age: 79
End: 2024-06-02

## 2024-06-07 ENCOUNTER — TRANSFERRED RECORDS (OUTPATIENT)
Dept: HEALTH INFORMATION MANAGEMENT | Facility: CLINIC | Age: 79
End: 2024-06-07
Payer: MEDICARE

## 2024-06-20 DIAGNOSIS — R56.9 FIRST TIME SEIZURE (H): ICD-10-CM

## 2024-06-20 NOTE — TELEPHONE ENCOUNTER
Pending Prescriptions:                       Disp   Refills    levETIRAcetam (KEPPRA) 500 MG tablet      182 ta*3            Sig: Take 1 tablet (500 mg) by mouth 2 times daily        Last Appt: 3/26/2024   Next Appt: 3/26/2025

## 2024-06-21 RX ORDER — LEVETIRACETAM 500 MG/1
500 TABLET ORAL 2 TIMES DAILY
Qty: 182 TABLET | Refills: 3 | OUTPATIENT
Start: 2024-06-21

## 2024-06-21 NOTE — TELEPHONE ENCOUNTER
Should have refills on file.  Year supply sent 3/25/24.     Message sent to pharmacy.     Marlee AVILES RN, BSN  [facility]

## 2024-07-10 ASSESSMENT — ACTIVITIES OF DAILY LIVING (ADL)
ROLLING_OVER_IN_BED: QUITE A BIT OF DIFFICULTY
MAKING_SHARP_TURNS_WHILE_RUNNING_FAST: EXTREME DIFFICULTY OR UNABLE TO PERFORM ACTIVITY
WALKING_A_MILE: A LITTLE BIT OF DIFFICULTY
SITTING_FOR_1_HOUR: NO DIFFICULTY
GOING_UP_OR_DOWN_10_STAIRS: A LITTLE BIT OF DIFFICULTY
PLEASE_INDICATE_YOR_PRIMARY_REASON_FOR_REFERRAL_TO_THERAPY:: FOOT AND/OR ANKLE
GETTING_INTO_OR_OUT_OF_A_CAR: A LITTLE BIT OF DIFFICULTY
PERFORMING_HEAVY_ACTIVITIES_AROUND_YOUR_HOME: QUITE A BIT OF DIFFICULTY
ANY_OF_YOUR_USUAL_WORK,_HOUSEWORK_OR_SCHOOL_ACTIVITIES: MODERATE DIFFICULTY
SQUATTING: QUITE A BIT OF DIFFICULTY
WALKING_BETWEEN_ROOMS: NO DIFFICULTY
SHOPPING: EXTREME DIFFICULTY OR UNABLE TO PERFORM ACTIVITY
YOUR_USUAL_HOBBIES,_RECREATIONAL_OR_SPORTING_ACTIVITIES: MODERATE DIFFICULTY
LEFS_RAW_SCORE: 0
RUNNING_ON_UNEVEN_GROUND: EXTREME DIFFICULTY OR UNABLE TO PERFORM ACTIVITY
PUTTING_ON_YOUR_SHOES_OR_SOCKS: MODERATE DIFFICULTY
LIFTING_AN_OBJECT,_LIKE_A_BAG_OF_GROCERIES_FROM_THE_FLOOR: MODERATE DIFFICULTY
WALKING_2_BLOCKS: NO DIFFICULTY
LEFS_SCORE(%): 0
STANDING_FOR_1_HOUR: QUITE A BIT OF DIFFICULTY
RUNNING_ON_EVEN_GROUND: EXTREME DIFFICULTY OR UNABLE TO PERFORM ACTIVITY
PERFORMING_LIGHT_ACTIVITIES_AROUND_YOUR_HOME: A LITTLE BIT OF DIFFICULTY
GETTING_INTO_AND_OUT_OF_A_BATH: NO DIFFICULTY

## 2024-07-11 ENCOUNTER — THERAPY VISIT (OUTPATIENT)
Dept: PHYSICAL THERAPY | Facility: CLINIC | Age: 79
End: 2024-07-11
Payer: MEDICARE

## 2024-07-11 DIAGNOSIS — M54.16 CHRONIC LUMBAR RADICULOPATHY: Primary | ICD-10-CM

## 2024-07-11 DIAGNOSIS — G62.9 POLYNEUROPATHY, UNSPECIFIED: ICD-10-CM

## 2024-07-11 PROCEDURE — 97162 PT EVAL MOD COMPLEX 30 MIN: CPT | Mod: GP | Performed by: PHYSICAL THERAPIST

## 2024-07-11 PROCEDURE — 97110 THERAPEUTIC EXERCISES: CPT | Mod: GP | Performed by: PHYSICAL THERAPIST

## 2024-07-11 NOTE — PROGRESS NOTES
"PHYSICAL THERAPY EVALUATION  Type of Visit: Evaluation       Fall Risk Screen:  Fall screen completed by: PT  Have you fallen 2 or more times in the past year?: No  Have you fallen and had an injury in the past year?: No  Is patient a fall risk?: No    Subjective       Presenting condition or subjective complaint: Weakness and pain in legs  Patient with chronic history of occasional low back; however, main issues is his legs, L worse than R. Low back pain increases following a round of golf (9holes) and during housework (vacuuming). Denies pain in his legs; however, reports a sensation in both legs, feels like they are \"lead\" and the bottoms of his feet don't have sensation but wouldn't described it as numbness, possibly tingling at the bottom of his feet. This sensation is constant with varying degrees of severity. Stretching (single and double knee to chest as well as a sidelying stretch) helps; however, if he over does it, the sensation gets worse. Also reports weakness in L>R legs. Has never been told he has a foot drop; however, when he walks in the snow his foot prints indicate he is dragging his feet.    Has never completed PT or worked with a pain clinic, even though he has seen several orthopedic doctors and a neurologist. Has had several injections w/o relief. Takes Lyrica; however, uncertain if it is helpful.    Date of onset: 01/01/24    Relevant medical history: Heart problems; Numbness or tingling in perianal area; Pain at night or rest; Seizures   Dates & types of surgery: Replace ascending aorta    Prior diagnostic imaging/testing results: MRI     Lumbar MRI 12/6/2021 (per patient new MRI available, will try to have results faxed over)  T12-L1:  Disc height maintained. No herniation. Mild bilateral facet  arthropathy. No foraminal or spinal canal stenosis.      L1-L2:  Disc height maintained. Subtle left central protrusion. Mild  bilateral facet arthropathy. No foraminal or spinal canal stenosis.   "   L2-L3:  Severe asymmetrical disc height loss on the left. Disc bulge,  asymmetric to the left. Mild bilateral facet arthropathy. No right  foraminal stenosis. Moderate left foraminal stenosis. Mild spinal  canal stenosis.       L3-L4:  Moderate disc height loss. Disc bulge. Moderate bilateral  facet arthropathy. Moderate to severe right foraminal stenosis. Mild  left foraminal stenosis. No spinal canal stenosis.       L4-L5:  Severe asymmetrical disc height loss on the right. Disc bulge,  asymmetric to the right. Severe right and mild left facet arthropathy.  Severe right foraminal stenosis. Moderate left foraminal stenosis. No  spinal canal stenosis.       L5-S1:  Severe disc height loss. Disc bulge. Moderate bilateral facet  arthropathy with left-sided synovial cystic change. Severe bilateral  foraminal stenosis. No spinal canal stenosis.  Prior therapy history for the same diagnosis, illness or injury: No      Prior Level of Function  Transfers: Independent  Ambulation: Independent  ADL: Independent  IADL:     Living Environment  Social support: With a significant other or spouse   Type of home: House; Multi-level   Stairs to enter the home: Yes 6     Ramp: No   Stairs inside the home: Yes 10 Is there a railing: Yes     Help at home: None  Equipment owned:       Employment: No    Hobbies/Interests: Golf walking    Patient goals for therapy:      Pain assessment: Location: bottom of feet /Ratin-7/10; annoyance level of sensation in legs 1/10     Objective   LUMBAR SPINE EVALUATION  PAIN: Pain Level with Use: 5/10 annoyance of sensation in Legs, 6-7/10 foot pain remains constant  Pain Quality: lead, weak  INTEGUMENTARY (edema, incisions):   POSTURE: Standing Posture: Rounded shoulders, Forward head, Lordosis decreased, Thoracic kyphosis increased, R lateral shift  GAIT:   Weightbearing Status:   Assistive Device(s):   Gait Deviations:   BALANCE/PROPRIOCEPTION:   WEIGHTBEARING ALIGNMENT:   NON-WEIGHTBEARING  ALIGNMENT:    ROM:   (Degrees) Left AROM Left PROM  Right AROM Right PROM   Hip Flexion       Hip Extension       Hip Abduction       Hip Adduction       Hip Internal Rotation       Hip External Rotation       Knee Flexion       Knee Extension       Lumbar Side glide 50%  To neutral - reports tight through back   Lumbar Flexion Knees - increased central low back pain, L post knee pain   Lumbar Extension 15% no pain   REIL: no effect; RSISR: centralizes  PELVIC/SI SCREEN:   STRENGTH:  TrA Contraction: fair, Glute Max B: +3/5; Knee Flx R: -5/5, L: +4/5    MYOTOMES:    Left Right   T12-L3 (Hip Flexion) 3+ 5-   L2-4 (Quads)  4 5   L4 (Ankle DF) 4+ 4+   L5 (Great Toe Ext) 4+ 4+   S1 (Toe Raise) 5 5     DTR S:   CORD SIGNS:   DERMATOMES:  decreased throughout L LE below knee  NEURAL TENSION:    Left Right   SLR     SLR with DF Positive Positive   Femoral Nerve     Slump     Jackson (Lumbar)     Jackson (Thoracic)     Jackson (Cervical)     Median     Ulnar     Radial        FLEXIBILITY: Decreased piriformis L, Decreased hip flexors L, Decreased quadriceps L, Decreased hamstrings L, Decreased piriformis R, Decreased hip flexors R, Decreased quadriceps R, Decreased hamstrings R  LUMBAR/HIP Special Tests:    PELVIS/SI SPECIAL TESTS:   FUNCTIONAL TESTS:   PALPATION: WNL  SPINAL SEGMENTAL CONCLUSIONS:       Assessment & Plan   CLINICAL IMPRESSIONS  Medical Diagnosis: R LE pain, Polyneuropathy unspecified    Treatment Diagnosis: lumbar radiculopathy   Impression/Assessment:     Clinical Decision Making (Complexity):  Clinical Presentation: Evolving/Changing  Clinical Presentation Rationale: based on medical and personal factors listed in PT evaluation  Clinical Decision Making (Complexity): Moderate complexity    PLAN OF CARE  Treatment Interventions:  Interventions: Gait Training, Manual Therapy, Neuromuscular Re-education, Therapeutic Activity, Therapeutic Exercise, Self-Care/Home Management    Long Term Goals     PT Goal 1  Goal  Identifier: walking  Goal Description: Patient will be able to walk 1.5 miles at a brisk pace in order for community mobility and healthy active lifestyle.  Goal Progress: 1/2 mile slow pace  Target Date: 09/05/24      Frequency of Treatment: 1x/wk  Duration of Treatment: 8 weeks    Recommended Referrals to Other Professionals:   Education Assessment:   Learner/Method: Patient;Family;Listening;Demonstration;Pictures/Video  Education Comments: Educated on findings of exam, anatomy, theory of repeated movement exercises and centralization, importance of HEP, likely frequency/duration of PT.    Risks and benefits of evaluation/treatment have been explained.   Patient/Family/caregiver agrees with Plan of Care.     Evaluation Time:     PT Eval, Moderate Complexity Minutes (20495): 30       Signing Clinician: CARA Lazar [facility]                                                                                   OUTPATIENT PHYSICAL THERAPY      PLAN OF TREATMENT FOR OUTPATIENT REHABILITATION   Patient's Last Name, First Name, Mason Perkins YOB: 1945   Provider's Name   [facility]   Medical Record No.  [id number]     Onset Date: 01/01/24  Start of Care Date: 07/11/24     Medical Diagnosis:  R LE pain, Polyneuropathy unspecified      PT Treatment Diagnosis:  lumbar radiculopathy Plan of Treatment  Frequency/Duration: 1x/wk/ 8 weeks    Certification date from 07/11/24 to 09/05/24         See note for plan of treatment details and functional goals     Rebecca Nichols PT                         I CERTIFY THE NEED FOR THESE SERVICES FURNISHED UNDER        THIS PLAN OF TREATMENT AND WHILE UNDER MY CARE .             Physician Signature               Date    X_____________________________________________________                  Referring Provider:  Butch Guzman    Initial Assessment  See Epic Evaluation- Start of Care Date:  07/11/24

## 2024-07-15 ENCOUNTER — TRANSCRIBE ORDERS (OUTPATIENT)
Dept: OTHER | Age: 79
End: 2024-07-15

## 2024-07-15 DIAGNOSIS — M79.604 PAIN OF RIGHT LOWER EXTREMITY: ICD-10-CM

## 2024-07-15 DIAGNOSIS — G62.9 POLYNEUROPATHY, UNSPECIFIED: Primary | ICD-10-CM

## 2024-07-17 ENCOUNTER — THERAPY VISIT (OUTPATIENT)
Dept: PHYSICAL THERAPY | Facility: CLINIC | Age: 79
End: 2024-07-17
Payer: MEDICARE

## 2024-07-17 DIAGNOSIS — G62.9 POLYNEUROPATHY, UNSPECIFIED: Primary | ICD-10-CM

## 2024-07-17 DIAGNOSIS — M54.16 CHRONIC LUMBAR RADICULOPATHY: ICD-10-CM

## 2024-07-17 PROCEDURE — 97110 THERAPEUTIC EXERCISES: CPT | Mod: GP | Performed by: PHYSICAL THERAPIST

## 2024-07-23 ASSESSMENT — SOCIAL DETERMINANTS OF HEALTH (SDOH): HOW OFTEN DO YOU GET TOGETHER WITH FRIENDS OR RELATIVES?: MORE THAN THREE TIMES A WEEK

## 2024-07-24 ENCOUNTER — OFFICE VISIT (OUTPATIENT)
Dept: PEDIATRICS | Facility: CLINIC | Age: 79
End: 2024-07-24
Payer: MEDICARE

## 2024-07-24 ENCOUNTER — THERAPY VISIT (OUTPATIENT)
Dept: PHYSICAL THERAPY | Facility: CLINIC | Age: 79
End: 2024-07-24
Payer: MEDICARE

## 2024-07-24 VITALS
TEMPERATURE: 97 F | SYSTOLIC BLOOD PRESSURE: 100 MMHG | HEART RATE: 57 BPM | WEIGHT: 178 LBS | HEIGHT: 67 IN | DIASTOLIC BLOOD PRESSURE: 60 MMHG | RESPIRATION RATE: 16 BRPM | BODY MASS INDEX: 27.94 KG/M2 | OXYGEN SATURATION: 98 %

## 2024-07-24 DIAGNOSIS — D12.6 TUBULAR ADENOMA OF COLON: ICD-10-CM

## 2024-07-24 DIAGNOSIS — Z11.59 NEED FOR HEPATITIS C SCREENING TEST: ICD-10-CM

## 2024-07-24 DIAGNOSIS — Z00.00 ENCOUNTER FOR MEDICARE ANNUAL WELLNESS EXAM: Primary | ICD-10-CM

## 2024-07-24 DIAGNOSIS — G62.9 POLYNEUROPATHY, UNSPECIFIED: Primary | ICD-10-CM

## 2024-07-24 DIAGNOSIS — G40.909 SEIZURE DISORDER (H): ICD-10-CM

## 2024-07-24 DIAGNOSIS — Z95.828 HX OF ASCENDING AORTA REPLACEMENT: ICD-10-CM

## 2024-07-24 DIAGNOSIS — G62.9 POLYNEUROPATHY, UNSPECIFIED: ICD-10-CM

## 2024-07-24 DIAGNOSIS — N40.1 BENIGN PROSTATIC HYPERPLASIA WITH LOWER URINARY TRACT SYMPTOMS, SYMPTOM DETAILS UNSPECIFIED: ICD-10-CM

## 2024-07-24 DIAGNOSIS — M41.9 SCOLIOSIS, UNSPECIFIED SCOLIOSIS TYPE, UNSPECIFIED SPINAL REGION: ICD-10-CM

## 2024-07-24 DIAGNOSIS — M54.16 CHRONIC LUMBAR RADICULOPATHY: ICD-10-CM

## 2024-07-24 LAB
ALBUMIN SERPL BCG-MCNC: 4.3 G/DL (ref 3.5–5.2)
ALP SERPL-CCNC: 62 U/L (ref 40–150)
ALT SERPL W P-5'-P-CCNC: 23 U/L (ref 0–70)
ANION GAP SERPL CALCULATED.3IONS-SCNC: 9 MMOL/L (ref 7–15)
AST SERPL W P-5'-P-CCNC: 21 U/L (ref 0–45)
BILIRUB SERPL-MCNC: 0.4 MG/DL
BUN SERPL-MCNC: 17.1 MG/DL (ref 8–23)
CALCIUM SERPL-MCNC: 9.8 MG/DL (ref 8.8–10.4)
CHLORIDE SERPL-SCNC: 107 MMOL/L (ref 98–107)
CHOLEST SERPL-MCNC: 191 MG/DL
CREAT SERPL-MCNC: 1.08 MG/DL (ref 0.67–1.17)
EGFRCR SERPLBLD CKD-EPI 2021: 71 ML/MIN/1.73M2
FASTING STATUS PATIENT QL REPORTED: NO
FASTING STATUS PATIENT QL REPORTED: NO
GLUCOSE SERPL-MCNC: 94 MG/DL (ref 70–99)
HCO3 SERPL-SCNC: 26 MMOL/L (ref 22–29)
HCV AB SERPL QL IA: NONREACTIVE
HDLC SERPL-MCNC: 46 MG/DL
LDLC SERPL CALC-MCNC: 120 MG/DL
NONHDLC SERPL-MCNC: 145 MG/DL
POTASSIUM SERPL-SCNC: 4.8 MMOL/L (ref 3.4–5.3)
PROT SERPL-MCNC: 6.8 G/DL (ref 6.4–8.3)
SODIUM SERPL-SCNC: 142 MMOL/L (ref 135–145)
TRIGL SERPL-MCNC: 127 MG/DL

## 2024-07-24 PROCEDURE — 36415 COLL VENOUS BLD VENIPUNCTURE: CPT | Performed by: INTERNAL MEDICINE

## 2024-07-24 PROCEDURE — 80061 LIPID PANEL: CPT | Mod: GZ | Performed by: INTERNAL MEDICINE

## 2024-07-24 PROCEDURE — 80053 COMPREHEN METABOLIC PANEL: CPT | Performed by: INTERNAL MEDICINE

## 2024-07-24 PROCEDURE — 97110 THERAPEUTIC EXERCISES: CPT | Mod: GP | Performed by: PHYSICAL THERAPIST

## 2024-07-24 PROCEDURE — 86803 HEPATITIS C AB TEST: CPT | Performed by: INTERNAL MEDICINE

## 2024-07-24 PROCEDURE — 97112 NEUROMUSCULAR REEDUCATION: CPT | Mod: GP | Performed by: PHYSICAL THERAPIST

## 2024-07-24 PROCEDURE — 99213 OFFICE O/P EST LOW 20 MIN: CPT | Mod: 25 | Performed by: INTERNAL MEDICINE

## 2024-07-24 PROCEDURE — G0439 PPPS, SUBSEQ VISIT: HCPCS | Performed by: INTERNAL MEDICINE

## 2024-07-24 ASSESSMENT — PAIN SCALES - GENERAL: PAINLEVEL: NO PAIN (0)

## 2024-07-24 NOTE — PATIENT INSTRUCTIONS
Patient Education   Preventive Care Advice   This is general advice given by our system to help you stay healthy. However, your care team may have specific advice just for you. Please talk to your care team about your preventive care needs.  Nutrition  Eat 5 or more servings of fruits and vegetables each day.  Try wheat bread, brown rice and whole grain pasta (instead of white bread, rice, and pasta).  Get enough calcium and vitamin D. Check the label on foods and aim for 100% of the RDA (recommended daily allowance).  Lifestyle  Exercise at least 150 minutes each week  (30 minutes a day, 5 days a week).  Do muscle strengthening activities 2 days a week. These help control your weight and prevent disease.  No smoking.  Wear sunscreen to prevent skin cancer.  Have a dental exam and cleaning every 6 months.  Yearly exams  See your health care team every year to talk about:  Any changes in your health.  Any medicines your care team has prescribed.  Preventive care, family planning, and ways to prevent chronic diseases.  Shots (vaccines)   HPV shots (up to age 26), if you've never had them before.  Hepatitis B shots (up to age 59), if you've never had them before.  COVID-19 shot: Get this shot when it's due.  Flu shot: Get a flu shot every year.  Tetanus shot: Get a tetanus shot every 10 years.  Pneumococcal, hepatitis A, and RSV shots: Ask your care team if you need these based on your risk.  Shingles shot (for age 50 and up)  General health tests  Diabetes screening:  Starting at age 35, Get screened for diabetes at least every 3 years.  If you are younger than age 35, ask your care team if you should be screened for diabetes.  Cholesterol test: At age 39, start having a cholesterol test every 5 years, or more often if advised.  Bone density scan (DEXA): At age 50, ask your care team if you should have this scan for osteoporosis (brittle bones).  Hepatitis C: Get tested at least once in your life.  STIs (sexually  transmitted infections)  Before age 24: Ask your care team if you should be screened for STIs.  After age 24: Get screened for STIs if you're at risk. You are at risk for STIs (including HIV) if:  You are sexually active with more than one person.  You don't use condoms every time.  You or a partner was diagnosed with a sexually transmitted infection.  If you are at risk for HIV, ask about PrEP medicine to prevent HIV.  Get tested for HIV at least once in your life, whether you are at risk for HIV or not.  Cancer screening tests  Cervical cancer screening: If you have a cervix, begin getting regular cervical cancer screening tests starting at age 21.  Breast cancer scan (mammogram): If you've ever had breasts, begin having regular mammograms starting at age 40. This is a scan to check for breast cancer.  Colon cancer screening: It is important to start screening for colon cancer at age 45.  Have a colonoscopy test every 10 years (or more often if you're at risk) Or, ask your provider about stool tests like a FIT test every year or Cologuard test every 3 years.  To learn more about your testing options, visit:   .  For help making a decision, visit:   https://bit.ly/lw40301.  Prostate cancer screening test: If you have a prostate, ask your care team if a prostate cancer screening test (PSA) at age 55 is right for you.  Lung cancer screening: If you are a current or former smoker ages 50 to 80, ask your care team if ongoing lung cancer screenings are right for you.  For informational purposes only. Not to replace the advice of your health care provider. Copyright   2023 [facility]. All rights reserved. Clinically reviewed by the [facility] Transitions Program. Jildy [id number] - REV 01/24.  Preventing Falls: Care Instructions  Injuries and health problems such as trouble walking or poor eyesight can increase your risk of falling. So can some medicines. But there are things you can do to help  "prevent falls. You can exercise to get stronger. You can also arrange your home to make it safer.    Talk to your doctor about the medicines you take. Ask if any of them increase the risk of falls and whether they can be changed or stopped.   Try to exercise regularly. It can help improve your strength and balance. This can help lower your risk of falling.     Practice fall safety and prevention.    Wear low-heeled shoes that fit well and give your feet good support. Talk to your doctor if you have foot problems that make this hard.  Carry a cellphone or wear a medical alert device that you can use to call for help.  Use stepladders instead of chairs to reach high objects. Don't climb if you're at risk for falls. Ask for help, if needed.  Wear the correct eyeglasses, if you need them.    Make your home safer.    Remove rugs, cords, clutter, and furniture from walkways.  Keep your house well lit. Use night-lights in hallways and bathrooms.  Install and use sturdy handrails on stairways.  Wear nonskid footwear, even inside. Don't walk barefoot or in socks without shoes.    Be safe outside.    Use handrails, curb cuts, and ramps whenever possible.  Keep your hands free by using a shoulder bag or backpack.  Try to walk in well-lit areas. Watch out for uneven ground, changes in pavement, and debris.  Be careful in the winter. Walk on the grass or gravel when sidewalks are slippery. Use de-icer on steps and walkways. Add non-slip devices to shoes.    Put grab bars and nonskid mats in your shower or tub and near the toilet. Try to use a shower chair or bath bench when bathing.   Get into a tub or shower by putting in your weaker leg first. Get out with your strong side first. Have a phone or medical alert device in the bathroom with you.   Where can you learn more?  Go to https://www.[employer].net/patiented  Enter G117 in the search box to learn more about \"Preventing Falls: Care Instructions.\"  Current as of: July 17, " 2023               Content Version: 14.0    8845-1291 [employer].   Care instructions adapted under license by your healthcare professional. If you have questions about a medical condition or this instruction, always ask your healthcare professional. [employer] disclaims any warranty or liability for your use of this information.

## 2024-07-24 NOTE — PROGRESS NOTES
"Preventive Care Visit  [facility] EKTA Mcmanus MD, Internal Medicine - Pediatrics  Jul 24, 2024      Assessment & Plan     (Z00.00) Encounter for Medicare annual wellness exam  (primary encounter diagnosis)    (G40.909) Seizure disorder (H)  Comment:   Plan: well controlled, continue keppra.  managed by neurology    (M41.9) Scoliosis, unspecified scoliosis type, unspecified spinal region  (M54.16) Chronic lumbar radiculopathy  Comment:   Plan: chronic lower extrem weakness; starting PT for leg strength, improving.    (G62.9) Polyneuropathy, unspecified  Comment:   Plan: symptoms well controlled, continue Lyrica    (Z95.828) Hx of ascending aorta replacement  Comment:   Plan:  stable.          (N40.1) Benign prostatic hyperplasia with lower urinary tract symptoms, symptom details unspecified  Comment:   Plan: mild symptoms, declines rx.    (D12.6) Tubular adenoma of colon  Comment:   Plan: next colonoscopy due 2025    (Z11.59) Need for hepatitis C screening test  Comment:   Plan: Hepatitis C Screen Reflex to HCV RNA Quant and         Genotype                    BMI  Estimated body mass index is 28.3 kg/m  as calculated from the following:    Height as of this encounter: 1.689 m (5' 6.5\").    Weight as of this encounter: 80.7 kg (178 lb).       Counseling  Appropriate preventive services were addressed with this patient via screening, questionnaire, or discussion as appropriate for fall prevention, nutrition, physical activity, Tobacco-use cessation, weight loss and cognition.  Checklist reviewing preventive services available has been given to the patient.  Reviewed patient's diet, addressing concerns and/or questions.   He is at risk for psychosocial distress and has been provided with information to reduce risk.           Yulissa Sena is a 79 year old, presenting for the following:  Wellness Visit        7/24/2024     8:15 AM   Additional Questions   Roomed by Christine BAKER CMA "   Accompanied by Self         7/24/2024     8:15 AM   Patient Reported Additional Medications   Patient reports taking the following new medications n/a         Health Care Directive  Patient does not have a Health Care Directive or Living Will: Patient states has Advance Directive and will bring in a copy to clinic.    HPI              7/23/2024   General Health   How would you rate your overall physical health? Good   Feel stress (tense, anxious, or unable to sleep) Only a little      (!) STRESS CONCERN      7/23/2024   Nutrition   Diet: Regular (no restrictions)            11/29/2017   Exercise   Frequency of exercise: 6-7 days/week            7/23/2024   Social Factors   Frequency of gathering with friends or relatives More than three times a week   Worry food won't last until get money to buy more No   Food not last or not have enough money for food? No   Do you have housing? (Housing is defined as stable permanent housing and does not include staying ouside in a car, in a tent, in an abandoned building, in an overnight shelter, or couch-surfing.) Yes   Are you worried about losing your housing? No   Lack of transportation? No   Unable to get utilities (heat,electricity)? No            7/24/2024   Fall Risk   Gait Speed Test (Document in seconds) 4.68   Gait Speed Test Interpretation Less than or equal to 5.00 seconds - PASS             7/23/2024   Activities of Daily Living- Home Safety   Needs help with the following daily activites None of the above   Safety concerns in the home None of the above            7/23/2024   Dental   Dentist two times every year? Yes            7/23/2024   Hearing Screening   Hearing concerns? None of the above            7/23/2024   Driving Risk Screening   Patient/family members have concerns about driving No            7/23/2024   General Alertness/Fatigue Screening   Have you been more tired than usual lately? No            7/23/2024   Urinary Incontinence Screening   Bothered  by leaking urine in past 6 months No            2024   TB Screening   Were you born outside of the [place]? No            Today's PHQ-2 Score:       2024    11:28 AM   PHQ-2 (  [employer])   Q1: Little interest or pleasure in doing things 0   Q2: Feeling down, depressed or hopeless 0   PHQ-2 Score 0   Q1: Little interest or pleasure in doing things Not at all   Q2: Feeling down, depressed or hopeless Not at all   PHQ-2 Score 0           2024   Substance Use   Alcohol more than 3/day or more than 7/wk Not Applicable   Do you have a current opioid prescription? No   How severe/bad is pain from 1 to 10? 2/10   Do you use any other substances recreationally? No        Social History     Tobacco Use    Smoking status: Former     Current packs/day: 0.00     Average packs/day: 1 pack/day for 10.0 years (10.0 ttl pk-yrs)     Types: Cigarettes     Quit date: 1986     Years since quittin.0     Passive exposure: Never    Smokeless tobacco: Never   Vaping Use    Vaping status: Never Used   Substance Use Topics    Alcohol use: Yes     Comment: 1 beer qd    Drug use: No       ASCVD Risk   The ASCVD Risk score (Gloria DK, et al., 2019) failed to calculate for the following reasons:    Cannot find a previous HDL lab    Cannot find a previous total cholesterol lab            Reviewed and updated as needed this visit by Provider                    Patient Active Problem List   Diagnosis    Hx of ascending aorta replacement    Bicuspid aortic valve    Tubular adenoma of colon    DDD (degenerative disc disease), thoracolumbar    Polyneuropathy, unspecified    Chronic lumbar radiculopathy    Scoliosis, unspecified scoliosis type, unspecified spinal region    Renal atrophy, left    Seizure disorder (H)    Benign prostatic hyperplasia with lower urinary tract symptoms, symptom details unspecified     Past Surgical History:   Procedure Laterality Date    BIOPSY      Colonoscopy    CARDIAC SURGERY       Replace ascending aorta    COLONOSCOPY      EYE SURGERY  Cataract    HERNIA REPAIR  1050+\-    REPAIR ANEURYSM THORACOABDOMINAL      repair 2013, [place]       Social History     Tobacco Use    Smoking status: Former     Current packs/day: 0.00     Average packs/day: 1 pack/day for 10.0 years (10.0 ttl pk-yrs)     Types: Cigarettes     Quit date: 1986     Years since quittin.0     Passive exposure: Never    Smokeless tobacco: Never   Substance Use Topics    Alcohol use: Yes     Comment: 1 beer qd     Family History   Problem Relation Age of Onset    C.A.D. Maternal Grandmother         heart attack    Cerebrovascular Disease Paternal Grandmother     Hypertension Mother     Prostate Cancer Father     Cancer - colorectal No family hx of          Current Outpatient Medications   Medication Sig Dispense Refill    Ascorbic Acid (VITAMIN C) 500 MG CAPS Take by mouth daily      levETIRAcetam (KEPPRA) 500 MG tablet Take 1 tablet (500 mg) by mouth 2 times daily 182 tablet 3    metoprolol succinate ER (TOPROL XL) 25 MG 24 hr tablet Take 0.5 tablets (12.5 mg) by mouth daily 45 tablet 11    MULTI-VITAMIN OR TABS None Entered      pregabalin (LYRICA) 100 MG capsule Take 1 capsule (100 mg) by mouth 2 times daily 180 capsule 3     Current providers sharing in care for this patient include:  Patient Care Team:  Terry Mcmanus MD as PCP - General  Terry Mcmanus MD as Assigned PCP  Frank Rahman MD as Assigned Neuroscience Provider    The following health maintenance items are reviewed in Epic and correct as of today:  Health Maintenance   Topic Date Due    HEPATITIS C SCREENING  Never done    RSV VACCINE (Pregnancy & 60+) (1 - 1-dose 60+ series) Never done    ANNUAL REVIEW OF HM ORDERS  2022    LIPID  2022    COVID-19 Vaccine ( - - season) 2024    INFLUENZA VACCINE (1) 2024    MEDICARE ANNUAL WELLNESS VISIT  2025    FALL RISK ASSESSMENT  2025     "COLORECTAL CANCER SCREENING  08/24/2025    ADVANCE CARE PLANNING  07/24/2029    DTAP/TDAP/TD IMMUNIZATION (3 - Td or Tdap) 09/13/2031    PHQ-2 (once per calendar year)  Completed    Pneumococcal Vaccine: 65+ Years  Completed    ZOSTER IMMUNIZATION  Completed    IPV IMMUNIZATION  Aged Out    HPV IMMUNIZATION  Aged Out    MENINGITIS IMMUNIZATION  Aged Out    RSV MONOCLONAL ANTIBODY  Aged Out    GLUCOSE  Discontinued         Review of Systems  Constitutional, neuro, ENT, endocrine, pulmonary, cardiac, gastrointestinal, genitourinary, musculoskeletal, integument and psychiatric systems are negative, except as otherwise noted.     Objective    Exam  /60   Pulse 57   Temp 97  F (36.1  C) (Tympanic)   Resp 16   Ht 1.689 m (5' 6.5\")   Wt 80.7 kg (178 lb)   SpO2 98%   BMI 28.30 kg/m     Estimated body mass index is 28.3 kg/m  as calculated from the following:    Height as of this encounter: 1.689 m (5' 6.5\").    Weight as of this encounter: 80.7 kg (178 lb).    Physical Exam  GENERAL: alert and no distress  EYES: Eyes grossly normal to inspection, PERRL and conjunctivae and sclerae normal  HENT: ear canals and TM's normal, nose and mouth without ulcers or lesions  NECK: no adenopathy, no asymmetry, masses, or scars  RESP: lungs clear to auscultation - no rales, rhonchi or wheezes  CV: regular rate and rhythm, normal S1 S2, no S3 or S4, no murmur, click or rub, no peripheral edema  ABDOMEN: soft, nontender, no hepatosplenomegaly, no masses and bowel sounds normal  MS: no gross musculoskeletal defects noted, no edema  SKIN: no suspicious lesions or rashes  PSYCH: mentation appears normal, affect normal/bright        7/24/2024   Mini Cog   Clock Draw Score 2 Normal   3 Item Recall 3 objects recalled   Mini Cog Total Score 5                 Signed Electronically by: Terry Mcmanus MD    "

## 2024-07-31 ENCOUNTER — THERAPY VISIT (OUTPATIENT)
Dept: PHYSICAL THERAPY | Facility: CLINIC | Age: 79
End: 2024-07-31
Payer: MEDICARE

## 2024-07-31 DIAGNOSIS — M54.16 CHRONIC LUMBAR RADICULOPATHY: ICD-10-CM

## 2024-07-31 DIAGNOSIS — G62.9 POLYNEUROPATHY, UNSPECIFIED: Primary | ICD-10-CM

## 2024-07-31 PROCEDURE — 97112 NEUROMUSCULAR REEDUCATION: CPT | Mod: GP | Performed by: PHYSICAL THERAPIST

## 2024-07-31 PROCEDURE — 97110 THERAPEUTIC EXERCISES: CPT | Mod: GP | Performed by: PHYSICAL THERAPIST

## 2024-08-07 ENCOUNTER — THERAPY VISIT (OUTPATIENT)
Dept: PHYSICAL THERAPY | Facility: CLINIC | Age: 79
End: 2024-08-07
Payer: MEDICARE

## 2024-08-07 DIAGNOSIS — M54.16 CHRONIC LUMBAR RADICULOPATHY: ICD-10-CM

## 2024-08-07 DIAGNOSIS — G62.9 POLYNEUROPATHY, UNSPECIFIED: Primary | ICD-10-CM

## 2024-08-07 PROCEDURE — 97110 THERAPEUTIC EXERCISES: CPT | Mod: GP | Performed by: PHYSICAL THERAPIST

## 2024-08-07 PROCEDURE — 97112 NEUROMUSCULAR REEDUCATION: CPT | Mod: GP | Performed by: PHYSICAL THERAPIST

## 2024-08-21 ENCOUNTER — THERAPY VISIT (OUTPATIENT)
Dept: PHYSICAL THERAPY | Facility: CLINIC | Age: 79
End: 2024-08-21
Payer: MEDICARE

## 2024-08-21 DIAGNOSIS — G62.9 POLYNEUROPATHY, UNSPECIFIED: Primary | ICD-10-CM

## 2024-08-21 DIAGNOSIS — M54.16 CHRONIC LUMBAR RADICULOPATHY: ICD-10-CM

## 2024-08-21 PROCEDURE — 97110 THERAPEUTIC EXERCISES: CPT | Mod: GP | Performed by: PHYSICAL THERAPIST

## 2024-08-21 PROCEDURE — 97112 NEUROMUSCULAR REEDUCATION: CPT | Mod: GP | Performed by: PHYSICAL THERAPIST

## 2024-10-08 ENCOUNTER — ANCILLARY PROCEDURE (OUTPATIENT)
Dept: GENERAL RADIOLOGY | Facility: CLINIC | Age: 79
End: 2024-10-08
Attending: PODIATRIST
Payer: MEDICARE

## 2024-10-08 ENCOUNTER — OFFICE VISIT (OUTPATIENT)
Dept: PODIATRY | Facility: CLINIC | Age: 79
End: 2024-10-08
Payer: MEDICARE

## 2024-10-08 VITALS — BODY MASS INDEX: 28.3 KG/M2 | WEIGHT: 178 LBS | SYSTOLIC BLOOD PRESSURE: 102 MMHG | DIASTOLIC BLOOD PRESSURE: 70 MMHG

## 2024-10-08 DIAGNOSIS — M79.671 RIGHT FOOT PAIN: Primary | ICD-10-CM

## 2024-10-08 DIAGNOSIS — M20.11 HAV (HALLUX ABDUCTO VALGUS), RIGHT: ICD-10-CM

## 2024-10-08 DIAGNOSIS — L84 CALLUS OF FOOT: ICD-10-CM

## 2024-10-08 DIAGNOSIS — M79.671 RIGHT FOOT PAIN: ICD-10-CM

## 2024-10-08 DIAGNOSIS — G62.9 POLYNEUROPATHY, UNSPECIFIED: ICD-10-CM

## 2024-10-08 PROCEDURE — 73630 X-RAY EXAM OF FOOT: CPT | Mod: TC | Performed by: RADIOLOGY

## 2024-10-08 PROCEDURE — 99203 OFFICE O/P NEW LOW 30 MIN: CPT | Performed by: PODIATRIST

## 2024-10-08 NOTE — PROGRESS NOTES
PATIENT HISTORY:  Mason Beatty is a 79 year old male who presents to clinic for pain to the right foot.  Here with his wife.  Notes that he has been having pain for the last few months.  Aching pain worse with walking especially barefoot.  Can be 7 out of 10.  Little bit better in shoes.  Notes he has a bunion.  Thinks it is caused a callus and that is what is causing the pain.  Wondering what can be done for it.    Review of Systems:  Patient denies fever, chills, rash, wound, stiffness,numbness, weakness, heart burn, blood in stool, chest pain with activity, calf pain when walking, shortness of breath with activity, chronic cough, easy bleeding/bruising, swelling of ankles, excessive thirst, fatigue, depression, anxiety.  Patient admits to limping..     PAST MEDICAL HISTORY:   Past Medical History:   Diagnosis Date    Aortic aneurysm of unspecified site without mention of rupture     Heart disease     Replace ascending aorta    History of blood transfusion Dec 2013    Nonspecific abnormal electrocardiogram (ECG) (EKG)     Scoliosis     Transverse myelitis (H)     2001        PAST SURGICAL HISTORY:   Past Surgical History:   Procedure Laterality Date    BIOPSY  7-2015    Colonoscopy    CARDIAC SURGERY      Replace ascending aorta    COLONOSCOPY      EYE SURGERY  Cataract    HERNIA REPAIR  1050+\-    REPAIR ANEURYSM THORACOABDOMINAL      repair 2013, [place]        MEDICATIONS:   Current Outpatient Medications:     Ascorbic Acid (VITAMIN C) 500 MG CAPS, Take by mouth daily, Disp: , Rfl:     levETIRAcetam (KEPPRA) 500 MG tablet, Take 1 tablet (500 mg) by mouth 2 times daily, Disp: 182 tablet, Rfl: 3    metoprolol succinate ER (TOPROL XL) 25 MG 24 hr tablet, Take 0.5 tablets (12.5 mg) by mouth daily, Disp: 45 tablet, Rfl: 11    MULTI-VITAMIN OR TABS, None Entered, Disp: , Rfl:     pregabalin (LYRICA) 100 MG capsule, Take 1 capsule (100 mg) by mouth 2 times daily, Disp: 180 capsule, Rfl: 3     ALLERGIES:     Allergies   Allergen Reactions    Shellfish Allergy Swelling        SOCIAL HISTORY:   Social History     Socioeconomic History    Marital status:      Spouse name: Not on file    Number of children: Not on file    Years of education: Not on file    Highest education level: Not on file   Occupational History    Not on file   Tobacco Use    Smoking status: Former     Current packs/day: 0.00     Average packs/day: 1 pack/day for 10.0 years (10.0 ttl pk-yrs)     Types: Cigarettes     Quit date: 1986     Years since quittin.2     Passive exposure: Never    Smokeless tobacco: Never   Vaping Use    Vaping status: Never Used   Substance and Sexual Activity    Alcohol use: Yes     Comment: 1 beer qd    Drug use: No    Sexual activity: Yes     Partners: Female     Birth control/protection: None   Other Topics Concern    Parent/sibling w/ CABG, MI or angioplasty before 65F 55M? No     Service Not Asked    Blood Transfusions Not Asked    Caffeine Concern Not Asked    Occupational Exposure Not Asked    Hobby Hazards Not Asked    Sleep Concern Not Asked    Stress Concern Not Asked    Weight Concern Not Asked    Special Diet Not Asked    Back Care Not Asked    Exercise Yes    Bike Helmet Not Asked    Seat Belt Not Asked    Self-Exams Not Asked   Social History Narrative    Not on file     Social Determinants of Health     Financial Resource Strain: Low Risk  (2024)    Financial Resource Strain     Within the past 12 months, have you or your family members you live with been unable to get utilities (heat, electricity) when it was really needed?: No   Food Insecurity: Low Risk  (2024)    Food Insecurity     Within the past 12 months, did you worry that your food would run out before you got money to buy more?: No     Within the past 12 months, did the food you bought just not last and you didn t have money to get more?: No   Transportation Needs: Low Risk  (2024)    Transportation Needs      Within the past 12 months, has lack of transportation kept you from medical appointments, getting your medicines, non-medical meetings or appointments, work, or from getting things that you need?: No   Physical Activity: Not on file   Stress: No Stress Concern Present (7/23/2024)    [place] [employer] - Occupational Stress Questionnaire     Feeling of Stress : Only a little   Social Connections: Unknown (7/23/2024)    Social Connection and Isolation Panel [NHANES]     Frequency of Communication with Friends and Family: Not on file     Frequency of Social Gatherings with Friends and Family: More than three times a week     Attends Taoist Services: Not on file     Active Member of Clubs or Organizations: Not on file     Attends Club or Organization Meetings: Not on file     Marital Status: Not on file   Interpersonal Safety: Low Risk  (7/24/2024)    Interpersonal Safety     Do you feel physically and emotionally safe where you currently live?: Yes     Within the past 12 months, have you been hit, slapped, kicked or otherwise physically hurt by someone?: No     Within the past 12 months, have you been humiliated or emotionally abused in other ways by your partner or ex-partner?: No   Housing Stability: Low Risk  (7/23/2024)    Housing Stability     Do you have housing? : Yes     Are you worried about losing your housing?: No        FAMILY HISTORY:   Family History   Problem Relation Age of Onset    C.A.D. Maternal Grandmother         heart attack    Cerebrovascular Disease Paternal Grandmother     Hypertension Mother     Prostate Cancer Father     Cancer - colorectal No family hx of         EXAM:Vitals: /70   Wt 80.7 kg (178 lb)   BMI 28.30 kg/m    BMI= Body mass index is 28.3 kg/m .    General appearance: Patient is alert and fully cooperative with history & exam.  No sign of distress is noted during the visit.     Psychiatric: Affect is pleasant & appropriate.  Patient appears  motivated to improve health.     Respiratory: Breathing is regular & unlabored while sitting.     HEENT: Hearing is intact to spoken word.  Speech is clear.  No gross evidence of visual impairment that would impact ambulation.     Dermatologic: Localized hyperkeratotic lesion to the plantar aspect of the right great toe.  No open lesions or signs of infection noted.     Vascular: DP & PT pulses are intact & regular bilaterally.  No significant edema or varicosities noted.  CFT and skin temperature is normal to both lower extremities.     Neurologic: Lower extremity sensation is diminished to feet.     Musculoskeletal: Patient is ambulatory without assistive device or brace.  Prominent first metatarsal head medially on the right foot with lateral deviation of the right great toe.    Radiographs: Right foot x-ray - I personally reviewed the xrays.  Increase in the first and second intermetatarsal angle with tibial sesamoid position of 5.  Minimal degenerative changes noted to the foot.  No fractures are noted.     ASSESSMENT:    Right foot pain  Polyneuropathy, unspecified  Callus of foot  Hav (hallux abducto valgus), right     Medical Decision Making/Plan:  Reviewed [facility] in [facility].  Reviewed and discussed x-rays.  Reviewed and discussed causes of bunion with patient.  Explained that it is in large part due to a patients foot type and how they walk.   Discussed treatment options with patient including orthotics, splints, pads, and shoe gear.  We discussed that sometimes cortisone injections can help with the pain or physical therapy treatments such as ultrasound to help with pain but does not fix the problem.  Discussed that this is normally a structural issue in the foot and if conservative therapy doesn't work, surgery is considered.  Distal osteotomy versus fusion.  With a distal osteotomy procedure, patient is normally minimally weight bearing in a cam boot for 6 weeks.  With fusion, patient is normally  non weight bearing for 6 weeks.  With any surgery, patient needs to take the first 2 weeks off work for icing and elevating and could possible due seated work only for the remaining 4 weeks after that.  We discussed risks of surgery including infection, neuritis, non union, need for further surgery.    He is not really having much pain in the joint and not interested in surgery at this time.    Discussed causes of keratomas.  They are due to areas of increase friction.  Hammertoes can create these as they put more pressure to the metatarsal head.  Discussed treatments such as using foot file, pumice stone, metatarsal pads, orthotics, and not walking barefoot.     We discussed the cost structure of callus care if they were to come back and have it treated in the clinic if insurance does not cover it and explained that they would be billed. They were also provided information on places to get the callus treatment.    Recommend using a pumice stone or a foot file 2-3 times a week to try to help keep it from building up.  Recommend motioning the feet daily.  Recommend inserts in the shoe.    All questions were answered to patient satisfaction he will call further questions or concerns.    Patient risk factor: Patient is at low risk for infection.        Sharron Richmond DPM, Podiatry/Foot and Ankle Surgery

## 2024-10-08 NOTE — LETTER
10/8/2024      Mason Beatty  [address]  [place] [address]      Dear Colleague,    Thank you for referring your patient, Mason Beatty, to the [facility]. Please see a copy of my visit note below.    PATIENT HISTORY:  Mason Beatty is a 79 year old male who presents to clinic for pain to the right foot.  Here with his wife.  Notes that he has been having pain for the last few months.  Aching pain worse with walking especially barefoot.  Can be 7 out of 10.  Little bit better in shoes.  Notes he has a bunion.  Thinks it is caused a callus and that is what is causing the pain.  Wondering what can be done for it.    Review of Systems:  Patient denies fever, chills, rash, wound, stiffness,numbness, weakness, heart burn, blood in stool, chest pain with activity, calf pain when walking, shortness of breath with activity, chronic cough, easy bleeding/bruising, swelling of ankles, excessive thirst, fatigue, depression, anxiety.  Patient admits to limping..     PAST MEDICAL HISTORY:   Past Medical History:   Diagnosis Date     Aortic aneurysm of unspecified site without mention of rupture      Heart disease     Replace ascending aorta     History of blood transfusion Dec 2013     Nonspecific abnormal electrocardiogram (ECG) (EKG)      Scoliosis      Transverse myelitis (H)     2001        PAST SURGICAL HISTORY:   Past Surgical History:   Procedure Laterality Date     BIOPSY  7-2015    Colonoscopy     CARDIAC SURGERY      Replace ascending aorta     COLONOSCOPY       EYE SURGERY  Cataract     HERNIA REPAIR  1050+\-     REPAIR ANEURYSM THORACOABDOMINAL      repair 2013, [place]        MEDICATIONS:   Current Outpatient Medications:      Ascorbic Acid (VITAMIN C) 500 MG CAPS, Take by mouth daily, Disp: , Rfl:      levETIRAcetam (KEPPRA) 500 MG tablet, Take 1 tablet (500 mg) by mouth 2 times daily, Disp: 182 tablet, Rfl: 3     metoprolol succinate ER (TOPROL XL) 25 MG 24 hr  tablet, Take 0.5 tablets (12.5 mg) by mouth daily, Disp: 45 tablet, Rfl: 11     MULTI-VITAMIN OR TABS, None Entered, Disp: , Rfl:      pregabalin (LYRICA) 100 MG capsule, Take 1 capsule (100 mg) by mouth 2 times daily, Disp: 180 capsule, Rfl: 3     ALLERGIES:    Allergies   Allergen Reactions     Shellfish Allergy Swelling        SOCIAL HISTORY:   Social History     Socioeconomic History     Marital status:      Spouse name: Not on file     Number of children: Not on file     Years of education: Not on file     Highest education level: Not on file   Occupational History     Not on file   Tobacco Use     Smoking status: Former     Current packs/day: 0.00     Average packs/day: 1 pack/day for 10.0 years (10.0 ttl pk-yrs)     Types: Cigarettes     Quit date: 1986     Years since quittin.2     Passive exposure: Never     Smokeless tobacco: Never   Vaping Use     Vaping status: Never Used   Substance and Sexual Activity     Alcohol use: Yes     Comment: 1 beer qd     Drug use: No     Sexual activity: Yes     Partners: Female     Birth control/protection: None   Other Topics Concern     Parent/sibling w/ CABG, MI or angioplasty before 65F 55M? No      Service Not Asked     Blood Transfusions Not Asked     Caffeine Concern Not Asked     Occupational Exposure Not Asked     Hobby Hazards Not Asked     Sleep Concern Not Asked     Stress Concern Not Asked     Weight Concern Not Asked     Special Diet Not Asked     Back Care Not Asked     Exercise Yes     Bike Helmet Not Asked     Seat Belt Not Asked     Self-Exams Not Asked   Social History Narrative     Not on file     Social Determinants of Health     Financial Resource Strain: Low Risk  (2024)    Financial Resource Strain      Within the past 12 months, have you or your family members you live with been unable to get utilities (heat, electricity) when it was really needed?: No   Food Insecurity: Low Risk  (2024)    Food Insecurity       Within the past 12 months, did you worry that your food would run out before you got money to buy more?: No      Within the past 12 months, did the food you bought just not last and you didn t have money to get more?: No   Transportation Needs: Low Risk  (7/23/2024)    Transportation Needs      Within the past 12 months, has lack of transportation kept you from medical appointments, getting your medicines, non-medical meetings or appointments, work, or from getting things that you need?: No   Physical Activity: Not on file   Stress: No Stress Concern Present (7/23/2024)    Citizen of [place] of Occupational Health - Occupational Stress Questionnaire      Feeling of Stress : Only a little   Social Connections: Unknown (7/23/2024)    Social Connection and Isolation Panel [NHANES]      Frequency of Communication with Friends and Family: Not on file      Frequency of Social Gatherings with Friends and Family: More than three times a week      Attends Roman Catholic Services: Not on file      Active Member of Clubs or Organizations: Not on file      Attends Club or Organization Meetings: Not on file      Marital Status: Not on file   Interpersonal Safety: Low Risk  (7/24/2024)    Interpersonal Safety      Do you feel physically and emotionally safe where you currently live?: Yes      Within the past 12 months, have you been hit, slapped, kicked or otherwise physically hurt by someone?: No      Within the past 12 months, have you been humiliated or emotionally abused in other ways by your partner or ex-partner?: No   Housing Stability: Low Risk  (7/23/2024)    Housing Stability      Do you have housing? : Yes      Are you worried about losing your housing?: No        FAMILY HISTORY:   Family History   Problem Relation Age of Onset     C.A.D. Maternal Grandmother         heart attack     Cerebrovascular Disease Paternal Grandmother      Hypertension Mother      Prostate Cancer Father      Cancer - colorectal No family hx of          EXAM:Vitals: /70   Wt 80.7 kg (178 lb)   BMI 28.30 kg/m    BMI= Body mass index is 28.3 kg/m .    General appearance: Patient is alert and fully cooperative with history & exam.  No sign of distress is noted during the visit.     Psychiatric: Affect is pleasant & appropriate.  Patient appears motivated to improve health.     Respiratory: Breathing is regular & unlabored while sitting.     HEENT: Hearing is intact to spoken word.  Speech is clear.  No gross evidence of visual impairment that would impact ambulation.     Dermatologic: Localized hyperkeratotic lesion to the plantar aspect of the right great toe.  No open lesions or signs of infection noted.     Vascular: DP & PT pulses are intact & regular bilaterally.  No significant edema or varicosities noted.  CFT and skin temperature is normal to both lower extremities.     Neurologic: Lower extremity sensation is diminished to feet.     Musculoskeletal: Patient is ambulatory without assistive device or brace.  Prominent first metatarsal head medially on the right foot with lateral deviation of the right great toe.    Radiographs: Right foot x-ray - I personally reviewed the xrays.  Increase in the first and second intermetatarsal angle with tibial sesamoid position of 5.  Minimal degenerative changes noted to the foot.  No fractures are noted.     ASSESSMENT:    Right foot pain  Polyneuropathy, unspecified  Callus of foot  Hav (hallux abducto valgus), right     Medical Decision Making/Plan:  Review[facility] in [facility].  Reviewed and discussed x-rays.  Reviewed and discussed causes of bunion with patient.  Explained that it is in large part due to a patients foot type and how they walk.   Discussed treatment options with patient including orthotics, splints, pads, and shoe gear.  We discussed that sometimes cortisone injections can help with the pain or physical therapy treatments such as ultrasound to help with pain but does not fix the  problem.  Discussed that this is normally a structural issue in the foot and if conservative therapy doesn't work, surgery is considered.  Distal osteotomy versus fusion.  With a distal osteotomy procedure, patient is normally minimally weight bearing in a cam boot for 6 weeks.  With fusion, patient is normally non weight bearing for 6 weeks.  With any surgery, patient needs to take the first 2 weeks off work for icing and elevating and could possible due seated work only for the remaining 4 weeks after that.  We discussed risks of surgery including infection, neuritis, non union, need for further surgery.    He is not really having much pain in the joint and not interested in surgery at this time.    Discussed causes of keratomas.  They are due to areas of increase friction.  Hammertoes can create these as they put more pressure to the metatarsal head.  Discussed treatments such as using foot file, pumice stone, metatarsal pads, orthotics, and not walking barefoot.     We discussed the cost structure of callus care if they were to come back and have it treated in the clinic if insurance does not cover it and explained that they would be billed. They were also provided information on places to get the callus treatment.    Recommend using a pumice stone or a foot file 2-3 times a week to try to help keep it from building up.  Recommend motioning the feet daily.  Recommend inserts in the shoe.    All questions were answered to patient satisfaction he will call further questions or concerns.    Patient risk factor: Patient is at low risk for infection.        Sharron Richmond DPM, Podiatry/Foot and Ankle Surgery    Again, thank you for allowing me to participate in the care of your patient.        Sincerely,        Sharron Richmond DPM, Podiatry/Foot and Ankle Surgery

## 2024-10-08 NOTE — PATIENT INSTRUCTIONS
Thank you for choosing [facility] Podiatry / Foot & Ankle Surgery!    DR CHIU'S CLINIC:  [facility]   [address]   [place], [place] [address] [phone]    (Tues, Wed, Thur am, Fri pm)     [facility]  [address], [place] [address]  [phone]    (Friday mornings)     TRIAGE LINE: [phone]  APPOINTMENTS: [phone]  RADIOLOGY: [phone]  SET UP SURGERY: [phone]  PHYSICAL THERAPY: [phone]   FAX NUMBER: [phone]  BILLING QUESTIONS: [phone]       Follow up: As needed      SUPER FEET-GREEN    CALLUS / CORNS / IPKs  When there is excessive friction or pressure on the skin, the body responds by making the skin thicker to protect the deeper structures from becoming exposed. While this works well to protect the deeper structures, the thickened skin can increase pressure and pain.    CALLUS: Flat, diffuse thickening are simple calluses and they are usually caused by friction. Often these are the result of rubbing on a shoe or going barefoot.    CORNS: Calluses with a central core between the toes are called corns. These result from prominent joints on adjacent toes rubbing together. Theses are a symptom of bone malalignment and will always recur unless the underlying bones are addressed surgically.    IPKs: Calluses with a central core on the ball of the foot are usually IPKs (intractable plantar keratosis). These are caused by excessive pressure from the metatarsals, the bones that make up the ball of the foot. Often one of these bones is too long or too prominent.  Again, these will always recur unless the underlying bone issue is addressed. There is no cure for these. They will either go away by themselves, recur, or more could develop.    ROUTINE MAINTENANCE  1. File them down with a pumice stone or callus file a couple times a week.   2. An electric callus removing device. Amope Pedi Perfect Electronic Pedicure Foot File  and Callus Remover can be a good option.   3. Lotion can be applied to soften the callus. A urea based cream such as Kersal or Vanicream or thicker cream with shea butter are good options.  4. Toe spacers or toe covers can be used for corns, gel pads can be used for other lesions on the bottom of the foot.   If there is a surgical pathology noted, such as a prominent bone, often this needs to be addressed surgically to minimize recurrence. However, sometimes the lesion simply migrates to another spot after surgery, so it is not a guaranteed cure.     **If you come back to clinic for treatment, insurance does not cover it, and you would be billed. This charge could range from $100 - $227**     Here is a list of routine foot care resources, which includes toenail trimming and callus/corn management.     This is not a referral. It is your responsibility to contact the organization and your insurance to confirm cost and coverage.      ROUTINE FOOT CARE (NAIL TRIMMING / CALLUSES)      [facility]  [phone]   [facility]  [phone]  Multiple locations   [facility]  [phone] Dr. Bynum and Dr. Rodriguez  [address] [place] [address]  [phone]   [facility]  [phone]  [facility]           [email or web address]   1-800-PEDIFIX       BUNION (HALLUX ABDUCTO VALGUS)  A bunion is caused by muscle imbalance. The great toe is pulled toward the smaller toes. The metatarsal head is pushed outward creating a lump on the side of your foot. Imbalance is the result of foot structure and instability.   Bunions do not improve with time. They usually enlarge, however this is a fairly slow process. Shoes do not necessarily cause bunions, however, they can hasten development and definitely cause bunions to hurt.   Bunions often run in families. We inherit a certain foot structure, which may be predisposed to bunion development.   Bunion pain is likely a combination of shoes rubbing on the  bump, nerve irritation, compression between the toes, joint misalignment, arthritis and altered gait.   SYMPTOMS   Bunions are usually termed mild, moderate or severe. Just because you have a bunion does not mean you have to have pain. There are some people with very severe bunions and no pain and people with mild bunions and a lot of pain.   - Pain on the inside of your foot at the big toe joint (1st MTPJ)   - Swelling on the inside of your foot at the big toe joint   - Redness on the inside of your foot at the big toe joint   - Numbness or burning in the big toe (hallux)   - Decreased motion at the big toe joint   - Painful bursa (fluid-filled sac) on the inside of your foot at the big toe joint   - Pain while wearing shoes -especially shoes too narrow or with high heels    - Pain during activities   - Corn in between the big toe and second toe   - Callous formation on the side or bottom of the big toe or big toe joint   - Callous under the second toe joint (2nd MTPJ)   - Pain in the second toe joint   TREATMENT  Conservative (non-surgical) treatment will not make the bunion go away, but it will hopefully decrease the signs and symptoms you have and help you get rid of the pain and get you back to your activities.   1.  Wider shoes or extra depth shoes: Most bunion pain can be improved simply by wearing compatible shoes. People with bunions cannot choose footwear simply because they like the style. Your bunion should determine which shoes are to be worn. Wide shoes with nonirritating seams,soft leather and a square toe box are most compatible with a bunion. Shoes should fit appropriately right out of the box but may need to be professionally stretched and modified to accommodate the bump. Heels, dress shoes and shoes with pointed toes will not be comfortable.   2. NSAIDs   3.  Arch supports, custom inserts, padding, splints, toe spacers : Most bunion pain can be improved simply by wearing compatible shoes. People  with bunions cannot choose footwear simply because they like the style. Your bunion should determine which shoes are to be worn. Wide shoes with nonirritating seams,soft leather and a square toe box are most compatible with a bunion. Shoes should fit appropriately right out of the box but may need to be professionally stretched and modified to accommodate the bump. Heels, dress shoes and shoes with pointed toes will not be comfortable.   4.  Change activities   5.  Physical therapy  SURGERY  Surgical treatment for bunions is sometimes needed. If you are limited by pain, cannot fit in shoes comfortably and are not able to do your daily activities then surgery may be a good option for you. There are many different surgical procedures to repair bunions. Your foot and ankle surgeon will review your foot exam findings, your x-rays, your age, your health, your lifestyle, your physical activity level and discuss with you which procedure he or she would recommend. Surgical procedures for bunions range from soft tissue repair to cutting and realigning the bones. It is not recommended that you have bunion surgery for cosmetic reasons (you do not like how your foot looks) or because you want to fit in a certain pair of shoes; There is the risk that even after surgery, the bunion will reoccur 9-10% of the time.   Bunion surgery involves cutting and repositioning the bones surrounding the bunion. Pins and screws are used to hold the bones in place during the healing process. The goal of bunion surgery is to reduce the size of the bunion bump. Realignment of the toe and joint is attempted.     Some first toes cannot be forced back into normal alignment even with surgery. Surgery is helpful in most cases but does not necessarily create a normal foot.   Healing after surgery requires about six weeks of protection. This allows the bone to heal. Maximum recovery takes about one year. The scar tissue and jOint structures require this  amount of time to finish the healing process. Expect stiffness, swelling and numbness during that time frame. Bunion surgery does involve side effects. Some side effects are predictable and others are less common but do occur. A scar will be visible and could be irritated by shoes. The shoe may rub on the screw or internal pin requiring surgical removal of these fixation devices. The screw and pin would likely be left in place for a full year. The first toe may loose motion after bunion surgery. The amount of stiffness is variable. Some people never regain normal motion of the first toe. This is due to scar tissue inherent to any surgery. The first toe may drift toward the second toe or away from the second toe. Spreading of the first and second toes is a rare occurrence after bunion surgery. This can be quite bothersome and would need to be surgically repaired. Toe drift toward the second toe could result in a recurrent bunion and revision surgery. Joint fusion is one option to correct an unstable, drifting toe. This procedure straightens the toe, however, no motion remains. Fusion may be necessary to correct complications of bunion surgery or as the original procedure in severe cases.   All surgical procedures involve risk of infection, numbness, pain, delayed healing, osteotomy dislocation, blood clots, continued foot pain, etc. Bunion surgery is quite complex and should not be taken lightly.   Any skin incision can lead to infection. Deep infection might involve the bone and thus repeat surgery and six weeks of IV antibiotics. Scar tissue can cause nerve pain or numbness. This is generally temporary but can be permanent. We do not have treatments that cure nerve problems. Second toe pain could be related to altered mechanics and pressure transferred to the second toe. Most feet with bunions have pre-existing second toe problems. Delayed bone healing would lengthen the healing time. Some bones simply do not heal.  This requires repeat surgery, electronic bone stimulation and/or extended protection. Smokers have an approximate 20% chance of poor bone healing. This is double that of a non-smoker. The bone cut may displace. This may need to be repaired with a second operation. Displacement can cause jOint malalignment. Immobility after surgery can cause blood clots in the legs and lungs. This could result in death.   Foot pain is complex. Most feet hurt for more than one reason. Fixing the bunion would not necessarily create a pain free foot. Appropriate shoes, healthy body weight, avoidance of bare foot walking and moderation of activity will always be necessary to enjoy foot comfort. Your bunion may involve arthritis, which is incurable even with surgery. Long standing bunions often involve chronic irritation to the surrounding nerves. Nerve pain may not resolve even with reducing the bunion bump since permanent nerve damage may be present   Bunion surgery is nevertheless quite successful. Most surgical patients are pleased with their foot following bunion surgery. Many of the issues described above can be controlled by taking proper care of your foot during the healing process.   Your surgeon would be happy to fully describe any of the above issues. You should pursue a full understanding of the operation,recovery process and any potential problems that could develop.   PREVENTION  1.  Do not wear high heels if there is a family history of bunions.  2.  Wear shoes that have enough width and depth in the toe box  Here are exercises that may benefit people with bunions:   Toe stretches - Stretching out your toes can help keep them limber and offset foot pain. To stretch your toes, point your toes straight ahead for 5 seconds and then curl them under for 5 seconds. Repeat these stretches 10 times. These exercises can be especially beneficial if you also have hammertoes, or chronically bent toes, in addition to a bunion.   Toe  flexing and la - Press your toes against a hard surface such as a wall, to flex and stretch them; hold the position for 10 seconds and repeat three to four times. Then flex your toes in the opposite direction; hold the position for 10 seconds and repeat three to four times.   Stretching your big toe - Using your fingers to gently pull your big toe over into proper alignment can be helpful as well. Hold your toe in position for 10 seconds and repeat three to four times.   Resistance exercises - Wrap either a towel or belt around your big toe and use it to pull your big toe toward you while simultaneously pushing forward, against the towel, with your big toe.   Ball roll - To massage the bottom of your foot, sit down, place a golf ball on the floor under your foot, and roll it around under your foot for two minutes. This can help relieve foot strain and cramping.   Towel curls - You can strengthen your toes by spreading out a small towel on the floor, curling your toes around it, and pulling it toward you. Repeat five times. Gripping objects with your toes like this can help keep your foot flexible.   Picking up marbles - Another gripping exercise you can perform to keep your foot flexible is picking up marbles with your toes. Do this by placing 20 marbles on the floor in front of you and use your foot to pick the marbles up one by one and place them in a bowl.   Walking along the beach - Whenever possible, spend time walking on sand. This can give you a gentle foot massage and also help strengthen your toes. This is especially beneficial for people who have arthritis associated with their bunions.

## 2025-01-06 ENCOUNTER — TELEPHONE (OUTPATIENT)
Dept: PEDIATRICS | Facility: CLINIC | Age: 80
End: 2025-01-06
Payer: MEDICARE

## 2025-01-06 NOTE — TELEPHONE ENCOUNTER
Reason for Call:  Appointment Request    Patient requesting this type of appt:  other    Requested provider: Terry Mcmanus    Reason patient unable to be scheduled: Not within requested timeframe    When does patient want to be seen/preferred time: asap or 1-2 days; not Wednesday of this week    Comments: Pt need an appt for wart on groin     Could we send this information to you in "[employer] or would you prefer to receive a phone call?:   Patient would like to be contacted via "[employer]    Call taken on 1/6/2025 at 10:58 AM by Fidelia Lilly

## 2025-01-09 ENCOUNTER — OFFICE VISIT (OUTPATIENT)
Dept: PEDIATRICS | Facility: CLINIC | Age: 80
End: 2025-01-09
Payer: MEDICARE

## 2025-01-09 VITALS
TEMPERATURE: 97 F | WEIGHT: 176 LBS | HEIGHT: 66 IN | DIASTOLIC BLOOD PRESSURE: 60 MMHG | SYSTOLIC BLOOD PRESSURE: 102 MMHG | HEART RATE: 52 BPM | OXYGEN SATURATION: 94 % | BODY MASS INDEX: 28.28 KG/M2 | RESPIRATION RATE: 16 BRPM

## 2025-01-09 DIAGNOSIS — K59.00 CONSTIPATION, UNSPECIFIED CONSTIPATION TYPE: Primary | ICD-10-CM

## 2025-01-09 ASSESSMENT — PAIN SCALES - GENERAL: PAINLEVEL_OUTOF10: MODERATE PAIN (5)

## 2025-01-09 NOTE — PROGRESS NOTES
"  Assessment & Plan     (K59.00) Constipation, unspecified constipation type  (primary encounter diagnosis)  Comment:   Plan: start miralax daily, push fluids, given handout on dietary fiber sources (or start metamucil or citrucel) . Maintain a high fiber diet   Follow-up next few weeks if constipation persists      BMI  Estimated body mass index is 28.84 kg/m  as calculated from the following:    Height as of this encounter: 1.664 m (5' 5.5\").    Weight as of this encounter: 79.8 kg (176 lb).             Yulissa Sena is a 79 year old, presenting for the following health issues:  Groin Pain        1/9/2025    11:17 AM   Additional Questions   Roomed by Christine BAKER CMA   Accompanied by Self         1/9/2025    11:17 AM   Patient Reported Additional Medications   Patient reports taking the following new medications n/a     History of Present Illness       Reason for visit:  Pain in groin  Symptom onset:  3-7 days ago  Symptom intensity:  Mild  Symptom progression:  Staying the same  Had these symptoms before:  No  What makes it worse:  No  What makes it better:  No   He is taking medications regularly.     CC:Pt is here for 'pain in groin- for 3 weeks  Clarified-pt is concerned regarding perianal \"lump\" and constipation (denies groin pain)  Recently has been constipated. Pain comes and goes, dull achy abd pain.   Hasn't tried anything for constipation    Patient Active Problem List   Diagnosis    Hx of ascending aorta replacement    Bicuspid aortic valve    Tubular adenoma of colon    DDD (degenerative disc disease), thoracolumbar    Scoliosis, unspecified scoliosis type, unspecified spinal region    Renal atrophy, left    Seizure disorder (H)    Benign prostatic hyperplasia with lower urinary tract symptoms, symptom details unspecified     Current Outpatient Medications   Medication Sig Dispense Refill    Ascorbic Acid (VITAMIN C) 500 MG CAPS Take by mouth daily      levETIRAcetam (KEPPRA) 500 MG tablet Take 1 " "tablet (500 mg) by mouth 2 times daily 182 tablet 3    metoprolol succinate ER (TOPROL XL) 25 MG 24 hr tablet Take 0.5 tablets (12.5 mg) by mouth daily 45 tablet 11    MULTI-VITAMIN OR TABS None Entered      pregabalin (LYRICA) 100 MG capsule Take 1 capsule (100 mg) by mouth 2 times daily 180 capsule 3          Objective    /60   Pulse 52   Temp 97  F (36.1  C) (Temporal)   Resp 16   Ht 1.664 m (5' 5.5\")   Wt 79.8 kg (176 lb)   SpO2 94%   BMI 28.84 kg/m    Body mass index is 28.84 kg/m .  Physical Exam   Perianal exam:  moderate amount of perianal stool present adherent to hair forming \"lump\"  Otherwise without hemorrhoid or other palpable perianal mass    Signed Electronically by: Terry Mcmanus MD    "

## 2025-01-09 NOTE — TELEPHONE ENCOUNTER
Received call back from patient. Scheduled for Office visit w/ PCP 1/9/25.     Mikal OLIVA RN 1/9/2025 at 8:06 AM

## 2025-01-09 NOTE — PATIENT INSTRUCTIONS
Constipation: start Miralax 1 capful in 8-12oz clear fluid once daily and increase until stools loosen.  Increase dietary fiber-see handout.  If dietary fiber not adequate, can take Metamucil or Citrucel daily

## 2025-03-26 ENCOUNTER — OFFICE VISIT (OUTPATIENT)
Dept: NEUROLOGY | Facility: CLINIC | Age: 80
End: 2025-03-26
Payer: MEDICARE

## 2025-03-26 VITALS
HEART RATE: 54 BPM | OXYGEN SATURATION: 97 % | DIASTOLIC BLOOD PRESSURE: 70 MMHG | SYSTOLIC BLOOD PRESSURE: 111 MMHG | BODY MASS INDEX: 28.84 KG/M2 | WEIGHT: 176 LBS

## 2025-03-26 DIAGNOSIS — R56.9 FIRST TIME SEIZURE (H): Primary | ICD-10-CM

## 2025-03-26 RX ORDER — LEVETIRACETAM 500 MG/1
500 TABLET ORAL 2 TIMES DAILY
Qty: 182 TABLET | Refills: 3 | Status: SHIPPED | OUTPATIENT
Start: 2025-03-26

## 2025-03-26 NOTE — PROGRESS NOTES
ESTABLISHED PATIENT NEUROLOGY NOTE    DATE OF VISIT: 3/26/2025  CLINIC LOCATION: [facility]  MRN: [id number]  PATIENT NAME: Mason Beatty  YOB: 1945    REASON FOR VISIT:   Chief Complaint   Patient presents with    Follow Up     Annual      SUBJECTIVE:                                                      HISTORY OF PRESENT ILLNESS: Patient is here to follow up regarding first-time seizure. Please refer to my initial/other prior notes for further information.  Accompanied by his wife.    Since the last visit, the patient reports no seizures.  He is on 500 mg of Keppra twice daily without noticeable side effects.  He denies interval development of new focal neurological symptoms.  EXAM:                                                    Physical Exam:   Vitals: /70   Pulse 54   Wt 79.8 kg (176 lb)   SpO2 97%   BMI 28.84 kg/m      General: pt is in NAD, cooperative.  Skin: normal turgor, moist mucous membranes, no lesions/rashes noticed.  HEENT: ATNC, white sclera, normal conjunctiva.  Respiratory: Symmetric lung excursion, no accessory respiratory muscle use.  Abdomen: Non distended.  Neurological: awake, cooperative, follows commands, no exam changes compared to the previous visits.  ASSESSMENT AND PLAN:                                                    Assessment: 79 year old male patient presents for follow-up of first-time seizure without recurrence on current medication regimen.  We had a detailed discussion regarding pros and cons of continuing Keppra versus trying to taper off.  He would like to continue Keppra, which is reasonable.  I refilled it for another year.    Diagnoses:    ICD-10-CM    1. First time seizure (H)  R56.9         Plan: At today's visit we thoroughly discussed current symptoms, available treatment options, and the plan.    We decided to continue Keppra without changes.  The Keppra prescription was refilled.     DVS form was also filled  out.    Next follow-up appointment is in the next 1 year or earlier if needed.    Total Time: 21 minutes spent on the date of the encounter doing chart review, history and exam, documentation and further activities per the note.    Frank Rahman MD  [facility] Neurology  (Chart documentation was completed in part with Dragon voice-recognition software. Even though reviewed, some grammatical, spelling, and word errors may remain.)

## 2025-03-26 NOTE — LETTER
3/26/2025      Mason Beatty  [address]  [place] [address]      Dear Colleague,    Thank you for referring your patient, Mason Beatty, to the [facility]. Please see a copy of my visit note below.    ESTABLISHED PATIENT NEUROLOGY NOTE    DATE OF VISIT: 3/26/2025  CLINIC LOCATION: [facility]  MRN: [id number]  PATIENT NAME: Mason Beatty  YOB: 1945    REASON FOR VISIT:   Chief Complaint   Patient presents with     Follow Up     Annual      SUBJECTIVE:                                                      HISTORY OF PRESENT ILLNESS: Patient is here to follow up regarding first-time seizure. Please refer to my initial/other prior notes for further information.  Accompanied by his wife.    Since the last visit, the patient reports no seizures.  He is on 500 mg of Keppra twice daily without noticeable side effects.  He denies interval development of new focal neurological symptoms.  EXAM:                                                    Physical Exam:   Vitals: /70   Pulse 54   Wt 79.8 kg (176 lb)   SpO2 97%   BMI 28.84 kg/m      General: pt is in NAD, cooperative.  Skin: normal turgor, moist mucous membranes, no lesions/rashes noticed.  HEENT: ATNC, white sclera, normal conjunctiva.  Respiratory: Symmetric lung excursion, no accessory respiratory muscle use.  Abdomen: Non distended.  Neurological: awake, cooperative, follows commands, no exam changes compared to the previous visits.  ASSESSMENT AND PLAN:                                                    Assessment: 79 year old male patient presents for follow-up of first-time seizure without recurrence on current medication regimen.  We had a detailed discussion regarding pros and cons of continuing Keppra versus trying to taper off.  He would like to continue Keppra, which is reasonable.  I refilled it for another year.    Diagnoses:    ICD-10-CM    1. First time seizure  (H)  R56.9         Plan: At today's visit we thoroughly discussed current symptoms, available treatment options, and the plan.    We decided to continue Keppra without changes.  The Keppra prescription was refilled.     DVS form was also filled out.    Next follow-up appointment is in the next 1 year or earlier if needed.    Total Time: 21 minutes spent on the date of the encounter doing chart review, history and exam, documentation and further activities per the note.    Frank Rahman MD  [facility] Neurology  (Chart documentation was completed in part with Dragon voice-recognition software. Even though reviewed, some grammatical, spelling, and word errors may remain.)      Again, thank you for allowing me to participate in the care of your patient.        Sincerely,        Frank Rahman MD    Electronically signed

## 2025-03-26 NOTE — PATIENT INSTRUCTIONS
AFTER VISIT SUMMARY (AVS):    At today's visit we thoroughly discussed current symptoms, available treatment options, and the plan.    We decided to continue Keppra without changes.  The Keppra prescription was refilled.    DVS form was also filled out.    Next follow-up appointment is in the next 1 year or earlier if needed.    Please do not hesitate to call me with any questions or concerns.    Thanks.    Nsaids Counseling: NSAID Counseling: I discussed with the patient that NSAIDs should be taken with food. Prolonged use of NSAIDs can result in the development of stomach ulcers.  Patient advised to stop taking NSAIDs if abdominal pain occurs.  The patient verbalized understanding of the proper use and possible adverse effects of NSAIDs.  All of the patient's questions and concerns were addressed.

## 2025-04-12 ENCOUNTER — HEALTH MAINTENANCE LETTER (OUTPATIENT)
Age: 80
End: 2025-04-12

## 2025-05-21 ENCOUNTER — MYC REFILL (OUTPATIENT)
Dept: PEDIATRICS | Facility: CLINIC | Age: 80
End: 2025-05-21
Payer: MEDICARE

## 2025-05-21 DIAGNOSIS — Z95.828 HX OF ASCENDING AORTA REPLACEMENT: ICD-10-CM

## 2025-05-22 RX ORDER — METOPROLOL SUCCINATE 25 MG/1
12.5 TABLET, EXTENDED RELEASE ORAL DAILY
Qty: 45 TABLET | Refills: 11 | Status: SHIPPED | OUTPATIENT
Start: 2025-05-22

## 2025-08-16 SDOH — HEALTH STABILITY: PHYSICAL HEALTH: ON AVERAGE, HOW MANY DAYS PER WEEK DO YOU ENGAGE IN MODERATE TO STRENUOUS EXERCISE (LIKE A BRISK WALK)?: 3 DAYS

## 2025-08-16 SDOH — HEALTH STABILITY: PHYSICAL HEALTH: ON AVERAGE, HOW MANY MINUTES DO YOU ENGAGE IN EXERCISE AT THIS LEVEL?: 20 MIN

## 2025-08-16 ASSESSMENT — SOCIAL DETERMINANTS OF HEALTH (SDOH): HOW OFTEN DO YOU GET TOGETHER WITH FRIENDS OR RELATIVES?: THREE TIMES A WEEK

## 2025-08-20 ENCOUNTER — OFFICE VISIT (OUTPATIENT)
Dept: PEDIATRICS | Facility: CLINIC | Age: 80
End: 2025-08-20
Payer: MEDICARE

## 2025-08-20 VITALS
RESPIRATION RATE: 20 BRPM | SYSTOLIC BLOOD PRESSURE: 103 MMHG | HEIGHT: 66 IN | BODY MASS INDEX: 28.09 KG/M2 | DIASTOLIC BLOOD PRESSURE: 64 MMHG | WEIGHT: 174.8 LBS | HEART RATE: 54 BPM | OXYGEN SATURATION: 98 % | TEMPERATURE: 96.4 F

## 2025-08-20 DIAGNOSIS — G40.909 SEIZURE DISORDER (H): ICD-10-CM

## 2025-08-20 DIAGNOSIS — Q23.81 BICUSPID AORTIC VALVE: ICD-10-CM

## 2025-08-20 DIAGNOSIS — Z00.00 ENCOUNTER FOR MEDICARE ANNUAL WELLNESS EXAM: Primary | ICD-10-CM

## 2025-08-20 DIAGNOSIS — M54.16 CHRONIC LUMBAR RADICULOPATHY: ICD-10-CM

## 2025-08-20 DIAGNOSIS — Z13.220 LIPID SCREENING: ICD-10-CM

## 2025-08-20 DIAGNOSIS — Z12.11 SCREEN FOR COLON CANCER: ICD-10-CM

## 2025-08-20 DIAGNOSIS — Z95.828 HX OF ASCENDING AORTA REPLACEMENT: ICD-10-CM

## 2025-08-20 DIAGNOSIS — M51.35 DDD (DEGENERATIVE DISC DISEASE), THORACOLUMBAR: ICD-10-CM

## 2025-08-20 LAB
ALBUMIN SERPL BCG-MCNC: 4 G/DL (ref 3.5–5.2)
ALP SERPL-CCNC: 64 U/L (ref 40–150)
ALT SERPL W P-5'-P-CCNC: 16 U/L (ref 0–70)
ANION GAP SERPL CALCULATED.3IONS-SCNC: 8 MMOL/L (ref 7–15)
AST SERPL W P-5'-P-CCNC: 23 U/L (ref 0–45)
BASOPHILS # BLD AUTO: 0.04 10E3/UL (ref 0–0.2)
BASOPHILS NFR BLD AUTO: 0.6 %
BILIRUB SERPL-MCNC: 0.6 MG/DL
BUN SERPL-MCNC: 11.2 MG/DL (ref 8–23)
CALCIUM SERPL-MCNC: 9.3 MG/DL (ref 8.8–10.4)
CHLORIDE SERPL-SCNC: 104 MMOL/L (ref 98–107)
CHOLEST SERPL-MCNC: 163 MG/DL
CREAT SERPL-MCNC: 0.98 MG/DL (ref 0.67–1.17)
EGFRCR SERPLBLD CKD-EPI 2021: 79 ML/MIN/1.73M2
EOSINOPHIL # BLD AUTO: 0.12 10E3/UL (ref 0–0.7)
EOSINOPHIL NFR BLD AUTO: 1.7 %
ERYTHROCYTE [DISTWIDTH] IN BLOOD BY AUTOMATED COUNT: 13.6 % (ref 10–15)
FASTING STATUS PATIENT QL REPORTED: ABNORMAL
FASTING STATUS PATIENT QL REPORTED: ABNORMAL
GLUCOSE SERPL-MCNC: 106 MG/DL (ref 70–99)
HCO3 SERPL-SCNC: 25 MMOL/L (ref 22–29)
HCT VFR BLD AUTO: 43.1 %
HDLC SERPL-MCNC: 40 MG/DL
HGB BLD-MCNC: 15 G/DL
IMM GRANULOCYTES # BLD: <0.04 10E3/UL
IMM GRANULOCYTES NFR BLD: 0 %
LDLC SERPL CALC-MCNC: 100 MG/DL
LYMPHOCYTES # BLD AUTO: 2.66 10E3/UL (ref 0.8–5.3)
LYMPHOCYTES NFR BLD AUTO: 37 %
MCH RBC QN AUTO: 34.5 PG (ref 26.5–33)
MCHC RBC AUTO-ENTMCNC: 34.8 G/DL (ref 31.5–36.5)
MCV RBC AUTO: 99.1 FL (ref 78–100)
MONOCYTES # BLD AUTO: 0.77 10E3/UL (ref 0–1.3)
MONOCYTES NFR BLD AUTO: 10.7 %
NEUTROPHILS # BLD AUTO: 3.6 10E3/UL (ref 1.6–8.3)
NEUTROPHILS NFR BLD AUTO: 50 %
NONHDLC SERPL-MCNC: 123 MG/DL
PLATELET # BLD AUTO: 207 10E3/UL (ref 150–450)
POTASSIUM SERPL-SCNC: 4.2 MMOL/L (ref 3.4–5.3)
PROT SERPL-MCNC: 6.4 G/DL (ref 6.4–8.3)
RBC # BLD AUTO: 4.35 10E6/UL
SODIUM SERPL-SCNC: 137 MMOL/L (ref 135–145)
TRIGL SERPL-MCNC: 113 MG/DL
WBC # BLD AUTO: 7.19 10E3/UL (ref 4–11)

## 2025-08-20 RX ORDER — PREGABALIN 100 MG/1
100 CAPSULE ORAL 2 TIMES DAILY
Qty: 180 CAPSULE | Refills: 3 | Status: SHIPPED | OUTPATIENT
Start: 2025-08-20

## 2025-08-20 RX ORDER — LEVETIRACETAM 500 MG/1
500 TABLET ORAL 2 TIMES DAILY
Qty: 180 TABLET | Refills: 11 | Status: SHIPPED | OUTPATIENT
Start: 2025-08-20

## 2025-08-20 ASSESSMENT — PAIN SCALES - GENERAL: PAINLEVEL_OUTOF10: MILD PAIN (2)

## 2025-08-24 ENCOUNTER — RESULTS FOLLOW-UP (OUTPATIENT)
Dept: PEDIATRICS | Facility: CLINIC | Age: 80
End: 2025-08-24
Payer: MEDICARE

## 2025-08-24 DIAGNOSIS — E78.5 HYPERLIPIDEMIA LDL GOAL <100: Primary | ICD-10-CM

## 2025-08-25 RX ORDER — ROSUVASTATIN CALCIUM 10 MG/1
10 TABLET, COATED ORAL DAILY
Qty: 90 TABLET | Refills: 3 | Status: SHIPPED | OUTPATIENT
Start: 2025-08-25